# Patient Record
Sex: FEMALE | Race: BLACK OR AFRICAN AMERICAN | Employment: OTHER | URBAN - METROPOLITAN AREA
[De-identification: names, ages, dates, MRNs, and addresses within clinical notes are randomized per-mention and may not be internally consistent; named-entity substitution may affect disease eponyms.]

---

## 2017-01-03 ENCOUNTER — OFFICE VISIT (OUTPATIENT)
Dept: CARDIOLOGY CLINIC | Age: 77
End: 2017-01-03

## 2017-01-03 VITALS
OXYGEN SATURATION: 99 % | RESPIRATION RATE: 18 BRPM | HEIGHT: 60 IN | DIASTOLIC BLOOD PRESSURE: 64 MMHG | HEART RATE: 90 BPM | BODY MASS INDEX: 24.94 KG/M2 | SYSTOLIC BLOOD PRESSURE: 120 MMHG | WEIGHT: 127 LBS

## 2017-01-03 DIAGNOSIS — I25.10 CORONARY ARTERY DISEASE INVOLVING NATIVE CORONARY ARTERY, ANGINA PRESENCE UNSPECIFIED, UNSPECIFIED WHETHER NATIVE OR TRANSPLANTED HEART: Primary | ICD-10-CM

## 2017-01-03 DIAGNOSIS — E78.5 DYSLIPIDEMIA: Chronic | ICD-10-CM

## 2017-01-03 DIAGNOSIS — R06.00 DYSPNEA, UNSPECIFIED TYPE: ICD-10-CM

## 2017-01-03 DIAGNOSIS — M79.602 PAIN OF LEFT UPPER EXTREMITY: ICD-10-CM

## 2017-01-03 RX ORDER — MONTELUKAST SODIUM 10 MG/1
10 TABLET ORAL DAILY
COMMUNITY

## 2017-01-03 RX ORDER — PANTOPRAZOLE SODIUM 20 MG/1
20 TABLET, DELAYED RELEASE ORAL DAILY
COMMUNITY

## 2017-01-03 NOTE — MR AVS SNAPSHOT
Visit Information Date & Time Provider Department Dept. Phone Encounter #  
 1/3/2017 10:40 AM Dominik Alejo MD [facility] Teena Andrews [phone] [id number] Upcoming Health Maintenance Date Due DTaP/Tdap/Td series (1 - Tdap) 12/11/1963 FOBT Q 1 YEAR AGE 50-75 12/11/1992 ZOSTER VACCINE AGE 60> 12/11/2002 GLAUCOMA SCREENING Q2Y 12/11/2007 Pneumococcal 65+ High/Highest Risk (1 of 2 - PCV13) 12/11/2007 MEDICARE YEARLY EXAM 12/11/2007 INFLUENZA AGE 9 TO ADULT 8/1/2016 BREAST CANCER SCRN MAMMOGRAM 3/4/2018 Allergies as of 1/3/2017  Review Complete On: 1/3/2017 By: Padmini Camarena LPN No Known Allergies Current Immunizations  Never Reviewed No immunizations on file. Not reviewed this visit You Were Diagnosed With   
  
 Codes Comments Coronary artery disease involving native coronary artery, angina presence unspecified, unspecified whether native or transplanted heart    -  Primary ICD-10-CM: I25.10 ICD-9-CM: 414.01 Vitals BP Pulse Resp Height(growth percentile) Weight(growth percentile) SpO2  
 120/64 (BP 1 Location: Left arm, BP Patient Position: Sitting) 90 18 5' (1.524 m) 127 lb (57.6 kg) 99% BMI OB Status Smoking Status 24.8 kg/m2 Hysterectomy Former Smoker Vitals History BMI and BSA Data Body Mass Index Body Surface Area  
 24.8 kg/m 2 1.56 m 2 Preferred Pharmacy Pharmacy Name Phone [facility] [address] [facility], [address] [place] [address] AT [address] [place] [address] [phone] Your Updated Medication List  
  
   
This list is accurate as of: 1/3/17 11:29 AM.  Always use your most recent med list.  
  
  
  
  
 ALPRAZolam 1 mg tablet Commonly known as:  Alcus Rubi Take 0.5 mg by mouth three (3) times daily. aspirin delayed-release 81 mg tablet Take  by mouth daily. cholecalciferol (vitamin D3) 2,000 unit Tab Take  by mouth every other day. clopidogrel 75 mg Tab Commonly known as:  PLAVIX Take 1 Tab by mouth daily. DULERA 100-5 mcg/actuation HFA inhaler Generic drug:  mometasone-formoterol Take 2 Puffs by inhalation as needed. escitalopram oxalate 10 mg tablet Commonly known as:  Pollyann [place] Take 10 mg by mouth daily. pantoprazole 20 mg tablet Commonly known as:  PROTONIX Take 20 mg by mouth daily. polyethylene glycol 17 gram/dose powder Commonly known as:  Corazon  Take 17 g by mouth every other day. promethazine 25 mg tablet Commonly known as:  PHENERGAN Take 1 tablet by mouth every six (6) hours as needed for Nausea. QUEtiapine 100 mg tablet Commonly known as:  SEROquel Take 50 mg by mouth nightly. simvastatin 40 mg tablet Commonly known as:  ZOCOR Take 40 mg by mouth nightly. SINGULAIR 10 mg tablet Generic drug:  montelukast  
Take 10 mg by mouth daily. SPIRIVA WITH HANDIHALER 18 mcg inhalation capsule Generic drug:  tiotropium Take 1 Cap by inhalation daily. traMADol 50 mg tablet Commonly known as:  ULTRAM  
Take 50 mg by mouth as needed for Pain. traZODone 100 mg tablet Commonly known as:  Whitethorn Askew Take 50 mg by mouth nightly. Introducing [facility]! [facility] introduces [employer]Live patient portal. Now you can access parts of your medical record, email your doctor's office, and request medication refills online. 1. In your internet browser, go to [email or web address]://[email or web address]. [employer] 2. Click on the First Time User? Click Here link in the Sign In box. You will see the New Member Sign Up page. 3. Enter your [employer] Access Code exactly as it appears below. You will not need to use this code after youve completed the sign-up process. If you do not sign up before the expiration date, you must request a new code. · [employer]Live Access Code: [email or web address] Expires: 1/19/2017 11:03 AM 
 
 4. Enter the last four digits of your Social Security Number (xxxx) and Date of Birth (mm/dd/yyyy) as indicated and click Submit. You will be taken to the next sign-up page. 5. Create a [employer] ID. This will be your [employer] login ID and cannot be changed, so think of one that is secure and easy to remember. 6. Create a [employer] password. You can change your password at any time. 7. Enter your Password Reset Question and Answer. This can be used at a later time if you forget your password. 8. Enter your e-mail address. You will receive e-mail notification when new information is available in [address] 9. Click Sign Up. You can now view and download portions of your medical record. 10. Click the Download Summary menu link to download a portable copy of your medical information. If you have questions, please visit the Frequently Asked Questions section of the [employer] website. Remember, [employer] is NOT to be used for urgent needs. For medical emergencies, dial 911. Now available from your iPhone and Android! Please provide this summary of care documentation to your next provider. Your primary care clinician is listed as William Silva. If you have any questions after today's visit, please call [phone].

## 2017-03-06 ENCOUNTER — HOSPITAL ENCOUNTER (OUTPATIENT)
Dept: MAMMOGRAPHY | Age: 77
Discharge: HOME | End: 2017-03-06
Attending: FAMILY MEDICINE
Payer: MEDICARE

## 2017-03-06 DIAGNOSIS — Z12.31 VISIT FOR SCREENING MAMMOGRAM: ICD-10-CM

## 2017-03-06 PROCEDURE — 77067 SCR MAMMO BI INCL CAD: CPT

## 2017-03-09 ENCOUNTER — HOSPITAL ENCOUNTER (OUTPATIENT)
Dept: NUCLEAR MEDICINE | Age: 77
Discharge: HOME | End: 2017-03-09
Attending: INTERNAL MEDICINE
Payer: MEDICARE

## 2017-03-09 DIAGNOSIS — R10.825 PERIUMBILICAL ABDOMINAL TENDERNESS WITH REBOUND TENDERNESS: ICD-10-CM

## 2017-03-09 DIAGNOSIS — R10.811 RUQ ABDOMINAL TENDERNESS: ICD-10-CM

## 2017-03-09 PROCEDURE — 78226 HEPATOBILIARY SYSTEM IMAGING: CPT

## 2017-05-10 RX ORDER — CLOPIDOGREL BISULFATE 75 MG/1
TABLET ORAL
Qty: 90 TAB | Refills: 11 | Status: SHIPPED | OUTPATIENT
Start: 2017-05-10

## 2017-06-13 ENCOUNTER — HOSPITAL ENCOUNTER (OUTPATIENT)
Dept: CT IMAGING | Age: 77
Discharge: HOME | End: 2017-06-13
Attending: INTERNAL MEDICINE
Payer: MEDICARE

## 2017-06-13 DIAGNOSIS — R93.89 ABNORMAL CHEST CT: ICD-10-CM

## 2017-06-13 PROCEDURE — 71250 CT THORAX DX C-: CPT

## 2017-07-10 ENCOUNTER — TELEPHONE (OUTPATIENT)
Dept: CARDIOLOGY CLINIC | Age: 77
End: 2017-07-10

## 2017-08-08 ENCOUNTER — OFFICE VISIT (OUTPATIENT)
Dept: CARDIOLOGY CLINIC | Age: 77
End: 2017-08-08

## 2017-08-08 VITALS
BODY MASS INDEX: 24.5 KG/M2 | HEART RATE: 77 BPM | DIASTOLIC BLOOD PRESSURE: 70 MMHG | WEIGHT: 124.8 LBS | RESPIRATION RATE: 18 BRPM | HEIGHT: 60 IN | OXYGEN SATURATION: 98 % | SYSTOLIC BLOOD PRESSURE: 120 MMHG

## 2017-08-08 DIAGNOSIS — I73.9 PVD (PERIPHERAL VASCULAR DISEASE) (HCC): ICD-10-CM

## 2017-08-08 DIAGNOSIS — J44.9 CHRONIC OBSTRUCTIVE PULMONARY DISEASE, UNSPECIFIED COPD TYPE (HCC): ICD-10-CM

## 2017-08-08 DIAGNOSIS — I25.10 CORONARY ARTERY DISEASE INVOLVING NATIVE CORONARY ARTERY OF NATIVE HEART WITHOUT ANGINA PECTORIS: Primary | ICD-10-CM

## 2017-08-08 DIAGNOSIS — G89.29 OTHER CHRONIC PAIN: ICD-10-CM

## 2017-08-08 RX ORDER — ONDANSETRON 4 MG/1
4 TABLET, FILM COATED ORAL
COMMUNITY

## 2017-08-08 RX ORDER — HYDROCODONE BITARTRATE AND ACETAMINOPHEN 7.5; 325 MG/1; MG/1
TABLET ORAL
COMMUNITY

## 2017-08-08 NOTE — MR AVS SNAPSHOT
Visit Information Date & Time Provider Department Dept. Phone Encounter #  
 8/8/2017  8:40 AM Kumar Ureña MD [facility] Tyrese Kendall [phone] [id number] Upcoming Health Maintenance Date Due DTaP/Tdap/Td series (1 - Tdap) 12/11/1963 FOBT Q 1 YEAR AGE 50-75 12/11/1992 ZOSTER VACCINE AGE 60> 10/11/2002 GLAUCOMA SCREENING Q2Y 12/11/2007 Pneumococcal 65+ High/Highest Risk (1 of 2 - PCV13) 12/11/2007 MEDICARE YEARLY EXAM 12/11/2007 INFLUENZA AGE 9 TO ADULT 8/1/2017 Allergies as of 8/8/2017  Review Complete On: 8/8/2017 By: Daniele Madrigal No Known Allergies Current Immunizations  Never Reviewed No immunizations on file. Not reviewed this visit You Were Diagnosed With   
  
 Codes Comments Chest pain of unknown etiology    -  Primary ICD-10-CM: R07.89 ICD-9-CM: 786.59 Coronary artery disease involving native coronary artery of native heart without angina pectoris     ICD-10-CM: I25.10 ICD-9-CM: 414.01 Vitals BP Pulse Resp Height(growth percentile) Weight(growth percentile) SpO2  
 120/70 (BP 1 Location: Left arm) 77 18 5' (1.524 m) 124 lb 12.8 oz (56.6 kg) 98% BMI OB Status Smoking Status 24.37 kg/m2 Hysterectomy Former Smoker Vitals History BMI and BSA Data Body Mass Index Body Surface Area  
 24.37 kg/m 2 1.55 m 2 Preferred Pharmacy Pharmacy Name Phone [address] [facility] AT [address] [place] [address]. [phone] Your Updated Medication List  
  
   
This list is accurate as of: 8/8/17  8:54 AM.  Always use your most recent med list.  
  
  
  
  
 ALPRAZolam 1 mg tablet Commonly known as:  Johnice Salts Take 0.5 mg by mouth three (3) times daily. aspirin delayed-release 81 mg tablet Take  by mouth daily. cholecalciferol (vitamin D3) 2,000 unit Tab Take  by mouth every other day. clopidogrel 75 mg Tab Commonly known as:  PLAVIX TAKE 1 TABLET BY MOUTH DAILY  
  
 escitalopram oxalate 10 mg tablet Commonly known as:  [place] Take 10 mg by mouth daily. HYDROcodone-acetaminophen 7.5-325 mg per tablet Commonly known as:  Bia Laity Take  by mouth two (2) times daily as needed for Pain.  
  
 pantoprazole 20 mg tablet Commonly known as:  PROTONIX Take 20 mg by mouth daily. polyethylene glycol 17 gram/dose powder Commonly known as:  Lugene Minder Take 17 g by mouth every other day. promethazine 25 mg tablet Commonly known as:  PHENERGAN Take 1 tablet by mouth every six (6) hours as needed for Nausea. QUEtiapine 100 mg tablet Commonly known as:  SEROquel Take 50 mg by mouth nightly. simvastatin 40 mg tablet Commonly known as:  ZOCOR Take 40 mg by mouth nightly. SINGULAIR 10 mg tablet Generic drug:  montelukast  
Take 10 mg by mouth daily. SPIRIVA WITH HANDIHALER 18 mcg inhalation capsule Generic drug:  tiotropium Take 1 Cap by inhalation daily. traZODone 100 mg tablet Commonly known as:  Erum Edgerton Take 50 mg by mouth nightly. ZOFRAN (AS HYDROCHLORIDE) 4 mg tablet Generic drug:  ondansetron hcl Take 4 mg by mouth every eight (8) hours as needed for Nausea. We Performed the Following AMB POC EKG ROUTINE W/ 12 LEADS, INTER & REP [86046 CPT(R)] Introducing [facility]! [employer] introduces [employer]ap patient portal. Now you can access parts of your medical record, email your doctor's office, and request medication refills online. 1. In your internet browser, go to [email or web address]://[email or web address]. [employer] 2. Click on the First Time User? Click Here link in the Sign In box. You will see the New Member Sign Up page. 3. Enter your [employer] Access Code exactly as it appears below. You will not need to use this code after youve completed the sign-up process.  If you do not sign up before the expiration date, you must request a new code. · [employer] Access Code: [email or web address] Expires: 9/4/2017  3:48 PM 
 
4. Enter the last four digits of your Social Security Number (xxxx) and Date of Birth (mm/dd/yyyy) as indicated and click Submit. You will be taken to the next sign-up page. 5. Create a [employer] ID. This will be your [employer] login ID and cannot be changed, so think of one that is secure and easy to remember. 6. Create a FilterSure password. You can change your password at any time. 7. Enter your Password Reset Question and Answer. This can be used at a later time if you forget your password. 8. Enter your e-mail address. You will receive e-mail notification when new information is available in [address] 9. Click Sign Up. You can now view and download portions of your medical record. 10. Click the Download Summary menu link to download a portable copy of your medical information. If you have questions, please visit the Frequently Asked Questions section of the [employer] website. Remember, FilterSure is NOT to be used for urgent needs. For medical emergencies, dial 911. Now available from your iPhone and Android! Please provide this summary of care documentation to your next provider. Your primary care clinician is listed as Eduardo Sinclair. If you have any questions after today's visit, please call [phone].

## 2017-08-08 NOTE — PROGRESS NOTES
Mike Martinez MD    [address] [facility] [place], [address] [facility]    Office [phone],[place] [phone]  Pager [phone] is a 78 y.o. 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 Probably musculoskeletal etiology. EKG no new changes. Hx of PVD  S/p L to R fem fem bypass 6/14/13 ( Dr Eulalia Elkins) complicated by  graft infection -seen vascular surgeon and was told that her circulation is doing okay  Hx of  CAD; UA (10/10/12) s/p PCI to mid LAD with SHARON ( xience 2.25by18); RCA -40%; Lcflex MLI; EF 45%; ECHO 10/11/12 -EF 60% ; ECHO ( prior) - Mild AR/ PFO  HLD  COPD   Hx of Ca lung s/p resection ( in remission/no radiation/chemo)   Anxiety /Depression -continues to be anxious during office visit  Hx of chronic Low Back Pain - MRI - DJD with disc disease- followed by Dr Francy Greene - has been advised PT/\"injections to back\"  Dyspnea    Plan:     Missy nuc 5/24/16-- nml/Nml EF. I had a long discussion with the patient and her son. I do not think the patient needs a cardiac catheterization at this point but I did indicate to them that if she continues to have chest tightness we may have to do the cardiac catheterization to rule out significant obstructive CAD.   However, I encouraged her to discuss with her physicians regarding her chronic pain/depression and anxiety. Patient and her son agree with her weight and watch policy with regards to her chronic atypical chest pain in view of the normal stress nuclear study in 2016. Continue cardiac meds  Pt currently taking statin - lipids per PCP  Patient is not on beta-blockers. She was briefly on midodrine for orthostatic hypotension and has not been able to tolerate antihypertensive medication. Continue to f/u specialists - ortho/psychiatrist advised to follow-up with primary care physician. F/u 6 mths     I appreciate the opportunity to be involved in Ms. Hatchettcare. See note below for details. Please do not hesitate to contact us with questions or concerns. Cong Bailey MD    Cardiac Testing/ Procedures: A. Cardiac Cath/PCI:10/10/12Prox/mid LAD - tubular lesion  Successful PCI with SHARON (Xience 2.5by 18mm)  -- Global left ventricular function was mildly depressed. EF calculated  by contrast ventriculography was 45 %. Probable LVH  -- Mid LAD: There was a 85 % stenosis. -- Proximal RCA: There was a 40 % stenosis. B.ECHO/RICHARD: 1/27/16 Procedure information: Technically limited study due to off axis window. Apical window was located 3rd intercostal extreme lateral towards back. Left ventricle: Systolic function was normal. Ejection fraction was  estimated in the range of 50 % to 55 %. There were no regional wall motion  abnormalities. Doppler parameters were consistent with abnormal left  ventricular relaxation (grade 1 diastolic dysfunction). Aortic valve: There was mild regurgitation. Pulmonic valve: There was mild to moderate regurgitation  10/11/12   Left ventricle: Size was normal. Systolic function was normal. Ejection  fraction was estimated to be 60 %. There were no regional wall motion  abnormalities. Doppler parameters were consistent with abnormal left  ventricular relaxation (grade 1 diastolic dysfunction). Right ventricle:  The size was normal. Systolic function was normal.    Atrial septum: There was a small left-to-right shunt on color Doppler and  right to left shunt on agitated saline study, suggestive of a patent  foramen ovale. Aortic valve: Regurgitation grade was 1+ on a scale of 0 to 4+. C.StressNuclear/Stress ECHO/Stress test:  May 24, 16 - LVEF 73%/Nml Missy nuc  D. 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 3 vessel run off bilateral  E. EP:   F. Miscellaneous: 6/14/13 Left to right femorofemoral bypass graft. ( Dr Yasir Amin)      Subjective:  Archie Reeves is a 78 y.o. female who returns for follow up visit. Patient is accompanied by her son today. She complains of multiple problems including chronic neck pain, left upper extremity pain, low back pain, chest pain, lower extremity pain. She apparently has been given narcotic pain medication for her back pain. She states that her medications are not helping her. She is also very tearful during the office visit. Son reports that patient has been anxious/depressed/has chronic pain. He feels that she has been shunted between various doctors and \"nobody seems to be helping her\". Patient states that her chest tightness has not changed significantly from her prior visits. Also complains of bloating sensation in her abdomen. Mild dyspnea on exertion. She has a history of PVD and has seen  vascular surgeon and he has told her that her circulation in the legs is doing well. Pt not active sec to pain issues. ROS:  (bold if positive, if negative)     Pain LE/Joints            Current Outpatient Prescriptions   Medication Sig Dispense    HYDROcodone-acetaminophen (NORCO) 7.5-325 mg per tablet Take  by mouth two (2) times daily as needed for Pain.  ondansetron hcl (ZOFRAN, AS HYDROCHLORIDE,) 4 mg tablet Take 4 mg by mouth every eight (8) hours as needed for Nausea.  clopidogrel (PLAVIX) 75 mg tab TAKE 1 TABLET BY MOUTH DAILY 90 Tab    montelukast (SINGULAIR) 10 mg tablet Take 10 mg by mouth daily.  pantoprazole (PROTONIX) 20 mg tablet Take 20 mg by mouth daily.  cholecalciferol, vitamin D3, 2,000 unit tab Take  by mouth every other day.  polyethylene glycol (MIRALAX) 17 gram/dose powder Take 17 g by mouth every other day.  aspirin delayed-release 81 mg tablet Take  by mouth daily.  QUEtiapine (SEROQUEL) 100 mg tablet Take 50 mg by mouth nightly.  escitalopram oxalate (LEXAPRO) 10 mg tablet Take 10 mg by mouth daily.  simvastatin (ZOCOR) 40 mg tablet Take 40 mg by mouth nightly.  promethazine (PHENERGAN) 25 mg tablet Take 1 tablet by mouth every six (6) hours as needed for Nausea. 15 tablet    ALPRAZolam (XANAX) 1 mg tablet Take 0.5 mg by mouth three (3) times daily.  traZODone (DESYREL) 100 mg tablet Take 50 mg by mouth nightly.  tiotropium (SPIRIVA WITH HANDIHALER) 18 mcg inhalation capsule Take 1 Cap by inhalation daily. No current facility-administered medications for this visit. Physical Exam:  Visit Vitals    /70 (BP 1 Location: Left arm)    Pulse 77    Resp 18    Ht 5' (1.524 m)    Wt 124 lb 12.8 oz (56.6 kg)    SpO2 98%    BMI 24.37 kg/m2          Gen: Well-developed, elderly, tearful  Neck: Supple,No JVD, No Carotid Bruit,   Resp: No accessory muscle use, Clear breath sounds, No rales or rhonchi  Card: Regular Rate,Rythm,Normal S1, S2, 2/6 sys murmur+, No rubs or gallop. No thrills. Abd:  Soft, non-tender, non-distended,BS+,   MSK: No cyanosis. Skin: No rashes    Neuro: moving all four extremities , follows commands appropriately  Psych:   oriented to person, place , alert, Tearful  LE: No edema    EKG: Sinus rhythm, T-wave changes anterior leads.   (No changes from prior EKG 3/2016)      LABS:        Lab Results   Component Value Date/Time    WBC 7.3 03/11/2016 05:47 PM    HGB 12.0 03/11/2016 05:47 PM    HCT 36.8 03/11/2016 05:47 PM    PLATELET 171 79/75/3833 05:47 PM    MCV 90.9 03/11/2016 05:47 PM     Lab Results   Component Value Date/Time    Sodium 140 03/11/2016 05:47 PM    Potassium 3.8 03/11/2016 05:47 PM    Chloride 106 03/11/2016 05:47 PM    CO2 28 03/11/2016 05:47 PM    Anion gap 6 03/11/2016 05:47 PM    Glucose 84 03/11/2016 05:47 PM    BUN 11 03/11/2016 05:47 PM    Creatinine 1.05 03/11/2016 05:47 PM    BUN/Creatinine ratio 10 03/11/2016 05:47 PM    GFR est AA >60 03/11/2016 05:47 PM    GFR est non-AA 51 03/11/2016 05:47 PM    Calcium 8.9 03/11/2016 05:47 PM       Lab Results   Component Value Date/Time    aPTT 24.7 09/17/2013 01:43 PM     Lab Results   Component Value Date/Time    INR 1.0 03/11/2016 05:47 PM    INR 1.1 09/17/2013 01:43 PM    INR 1.1 06/10/2013 03:58 PM    Prothrombin time 10.4 03/11/2016 05:47 PM    Prothrombin time 11.0 09/17/2013 01:43 PM    Prothrombin time 11.0 06/10/2013 03:58 PM     No components found for: Alis Ivory MD

## 2017-10-02 RX ORDER — CLOPIDOGREL BISULFATE 75 MG/1
TABLET ORAL
Qty: 30 TAB | Refills: 0 | Status: SHIPPED | OUTPATIENT
Start: 2017-10-02 | End: 2017-11-03 | Stop reason: SDUPTHER

## 2017-11-06 NOTE — TELEPHONE ENCOUNTER
Refill is per verbal order of Dr. Silas Jo.     Requested Prescriptions     Pending Prescriptions Disp Refills    clopidogrel (PLAVIX) 75 mg tab [Pharmacy Med Name: CLOPIDOGREL 75MG TABLETS] 30 Tab 3     Sig: TAKE 1 TABLET BY MOUTH DAILY

## 2017-11-07 RX ORDER — CLOPIDOGREL BISULFATE 75 MG/1
TABLET ORAL
Qty: 30 TAB | Refills: 3 | Status: SHIPPED | OUTPATIENT
Start: 2017-11-07 | End: 2022-01-01

## 2018-02-16 ENCOUNTER — HOSPITAL ENCOUNTER (OUTPATIENT)
Dept: CT IMAGING | Age: 78
Discharge: HOME | End: 2018-02-16
Attending: NURSE PRACTITIONER
Payer: MEDICARE

## 2018-02-16 DIAGNOSIS — R93.89 ABNORMAL CHEST CT: ICD-10-CM

## 2018-02-16 PROCEDURE — 71250 CT THORAX DX C-: CPT

## 2018-03-07 ENCOUNTER — HOSPITAL ENCOUNTER (OUTPATIENT)
Dept: PET IMAGING | Age: 78
Discharge: HOME | End: 2018-03-07
Attending: NURSE PRACTITIONER
Payer: MEDICARE

## 2018-03-07 VITALS — WEIGHT: 125 LBS | BODY MASS INDEX: 25.2 KG/M2 | HEIGHT: 59 IN

## 2018-03-07 DIAGNOSIS — R91.1 LUNG NODULE: ICD-10-CM

## 2018-03-07 PROCEDURE — A9552 F18 FDG: HCPCS

## 2018-03-07 RX ORDER — SODIUM CHLORIDE 0.9 % (FLUSH) 0.9 %
10 SYRINGE (ML) INJECTION
Status: COMPLETED | OUTPATIENT
Start: 2018-03-07 | End: 2018-03-07

## 2018-03-07 RX ADMIN — Medication 10 ML: at 12:55

## 2018-06-05 ENCOUNTER — HOSPITAL ENCOUNTER (OUTPATIENT)
Dept: MAMMOGRAPHY | Age: 78
Discharge: HOME | End: 2018-06-05
Attending: FAMILY MEDICINE
Payer: MEDICARE

## 2018-06-05 ENCOUNTER — HOSPITAL ENCOUNTER (OUTPATIENT)
Dept: CT IMAGING | Age: 78
Discharge: HOME | End: 2018-06-05
Attending: NURSE PRACTITIONER
Payer: MEDICARE

## 2018-06-05 DIAGNOSIS — R93.89 ABNORMAL CHEST CT: ICD-10-CM

## 2018-06-05 DIAGNOSIS — Z12.39 BREAST SCREENING: ICD-10-CM

## 2018-06-05 PROCEDURE — 77067 SCR MAMMO BI INCL CAD: CPT

## 2018-06-05 PROCEDURE — 71250 CT THORAX DX C-: CPT

## 2018-06-24 ENCOUNTER — HOSPITAL ENCOUNTER (OUTPATIENT)
Dept: MRI IMAGING | Age: 78
Discharge: HOME | End: 2018-06-24
Attending: PAIN MEDICINE
Payer: MEDICARE

## 2018-06-24 DIAGNOSIS — M54.16 RADICULOPATHY, LUMBAR REGION: ICD-10-CM

## 2018-06-24 PROCEDURE — 72148 MRI LUMBAR SPINE W/O DYE: CPT

## 2018-07-16 ENCOUNTER — DOCUMENTATION ONLY (OUTPATIENT)
Dept: CARDIOLOGY CLINIC | Age: 78
End: 2018-07-16

## 2018-07-16 NOTE — PROGRESS NOTES
Spoke with pt re her spine injections. Pt stated she has not decided if she is going to have them done or not. Advised that Spine center is asking for medication eval to hold Plavix prior to and that they would need something faxed to them. Pt advised she will call back with info if she does decide to have it done.

## 2018-08-09 ENCOUNTER — TELEPHONE (OUTPATIENT)
Dept: CARDIOLOGY CLINIC | Age: 78
End: 2018-08-09

## 2018-08-09 NOTE — TELEPHONE ENCOUNTER
Pt is calling to get clearance to hold her blood thinners for an injection she needs to have in her back. She can be reached @ [phone].      Thanks

## 2018-08-10 NOTE — TELEPHONE ENCOUNTER
Dr. Aileen Adams is out of office. Pt last injection was: 7/3/18 and he held Plavix for 5 days prior to procedure and restarted post procedure when safe to do so. Will forward to another Physician for recommendation. Please advise on holding Plavix for spinal injection.

## 2018-08-10 NOTE — TELEPHONE ENCOUNTER
Return call placed to pt. [facility] for return call. Per VO of Dr. Raina Bear pt may hold Plavix 5-7 days prior.

## 2018-09-13 ENCOUNTER — HOSPITAL ENCOUNTER (OUTPATIENT)
Dept: CT IMAGING | Age: 78
Discharge: HOME | End: 2018-09-13
Attending: NURSE PRACTITIONER
Payer: MEDICARE

## 2018-09-13 DIAGNOSIS — R93.89 ABNORMAL CHEST CT: ICD-10-CM

## 2018-09-13 PROCEDURE — 71250 CT THORAX DX C-: CPT

## 2019-02-18 ENCOUNTER — HOSPITAL ENCOUNTER (OUTPATIENT)
Dept: CT IMAGING | Age: 79
Discharge: HOME | End: 2019-02-18
Attending: NURSE PRACTITIONER
Payer: MEDICARE

## 2019-02-18 DIAGNOSIS — R93.89 ABNORMAL CHEST CT: ICD-10-CM

## 2019-02-18 PROCEDURE — 71250 CT THORAX DX C-: CPT

## 2020-03-06 ENCOUNTER — HOSPITAL ENCOUNTER (OUTPATIENT)
Dept: MAMMOGRAPHY | Age: 80
Discharge: HOME | End: 2020-03-06
Attending: FAMILY MEDICINE
Payer: MEDICARE

## 2020-03-06 DIAGNOSIS — Z12.31 VISIT FOR SCREENING MAMMOGRAM: ICD-10-CM

## 2020-03-06 PROCEDURE — 77067 SCR MAMMO BI INCL CAD: CPT

## 2021-04-27 ENCOUNTER — TRANSCRIBE ORDER (OUTPATIENT)
Dept: SCHEDULING | Age: 81
End: 2021-04-27

## 2021-04-27 DIAGNOSIS — Z12.31 VISIT FOR SCREENING MAMMOGRAM: Primary | ICD-10-CM

## 2021-06-04 ENCOUNTER — HOSPITAL ENCOUNTER (OUTPATIENT)
Dept: MAMMOGRAPHY | Age: 81
Discharge: HOME | End: 2021-06-04
Attending: FAMILY MEDICINE
Payer: MEDICAID

## 2021-06-04 DIAGNOSIS — Z12.31 VISIT FOR SCREENING MAMMOGRAM: ICD-10-CM

## 2021-06-04 PROCEDURE — 77067 SCR MAMMO BI INCL CAD: CPT

## 2021-07-21 ENCOUNTER — HOSPITAL ENCOUNTER (OUTPATIENT)
Age: 81
Setting detail: OBSERVATION
Discharge: HOME | End: 2021-07-22
Attending: EMERGENCY MEDICINE | Admitting: STUDENT IN AN ORGANIZED HEALTH CARE EDUCATION/TRAINING PROGRAM
Payer: MEDICARE

## 2021-07-21 ENCOUNTER — APPOINTMENT (OUTPATIENT)
Dept: GENERAL RADIOLOGY | Age: 81
End: 2021-07-21
Attending: EMERGENCY MEDICINE
Payer: MEDICARE

## 2021-07-21 ENCOUNTER — APPOINTMENT (OUTPATIENT)
Dept: CT IMAGING | Age: 81
End: 2021-07-21
Attending: EMERGENCY MEDICINE
Payer: MEDICARE

## 2021-07-21 DIAGNOSIS — R07.9 CHEST PAIN, UNSPECIFIED TYPE: ICD-10-CM

## 2021-07-21 DIAGNOSIS — E87.6 HYPOKALEMIA: ICD-10-CM

## 2021-07-21 DIAGNOSIS — J98.4 CAVITARY LESION OF LUNG: ICD-10-CM

## 2021-07-21 DIAGNOSIS — I25.10 CORONARY ARTERY DISEASE INVOLVING NATIVE CORONARY ARTERY OF NATIVE HEART WITHOUT ANGINA PECTORIS: ICD-10-CM

## 2021-07-21 DIAGNOSIS — R94.31 ECG ABNORMAL: Primary | ICD-10-CM

## 2021-07-21 PROBLEM — I20.8 ATYPICAL ANGINA (HCC): Status: ACTIVE | Noted: 2021-07-21

## 2021-07-21 LAB
ALBUMIN SERPL-MCNC: 3.2 G/DL (ref 3.5–5)
ALBUMIN/GLOB SERPL: 0.8 {RATIO} (ref 1.1–2.2)
ALP SERPL-CCNC: 75 U/L (ref 45–117)
ALT SERPL-CCNC: 18 U/L (ref 12–78)
ANION GAP SERPL CALC-SCNC: 8 MMOL/L (ref 5–15)
APPEARANCE UR: ABNORMAL
AST SERPL-CCNC: 20 U/L (ref 15–37)
ATRIAL RATE: 66 BPM
BACTERIA URNS QL MICRO: NEGATIVE /HPF
BASOPHILS # BLD: 0 K/UL (ref 0–0.1)
BASOPHILS NFR BLD: 1 % (ref 0–1)
BILIRUB SERPL-MCNC: 0.5 MG/DL (ref 0.2–1)
BILIRUB UR QL: NEGATIVE
BNP SERPL-MCNC: 309 PG/ML
BUN SERPL-MCNC: 11 MG/DL (ref 6–20)
BUN/CREAT SERPL: 15 (ref 12–20)
CALCIUM SERPL-MCNC: 8.8 MG/DL (ref 8.5–10.1)
CALCULATED P AXIS, ECG09: 14 DEGREES
CALCULATED R AXIS, ECG10: -14 DEGREES
CALCULATED T AXIS, ECG11: -61 DEGREES
CHLORIDE SERPL-SCNC: 110 MMOL/L (ref 97–108)
CO2 SERPL-SCNC: 22 MMOL/L (ref 21–32)
COLOR UR: ABNORMAL
COMMENT, HOLDF: NORMAL
CREAT SERPL-MCNC: 0.73 MG/DL (ref 0.55–1.02)
DIAGNOSIS, 93000: NORMAL
DIFFERENTIAL METHOD BLD: ABNORMAL
EOSINOPHIL # BLD: 0.1 K/UL (ref 0–0.4)
EOSINOPHIL NFR BLD: 1 % (ref 0–7)
EPITH CASTS URNS QL MICRO: ABNORMAL /LPF
ERYTHROCYTE [DISTWIDTH] IN BLOOD BY AUTOMATED COUNT: 12.7 % (ref 11.5–14.5)
GLOBULIN SER CALC-MCNC: 4.1 G/DL (ref 2–4)
GLUCOSE SERPL-MCNC: 54 MG/DL (ref 65–100)
GLUCOSE UR STRIP.AUTO-MCNC: NEGATIVE MG/DL
HCT VFR BLD AUTO: 33.5 % (ref 35–47)
HGB BLD-MCNC: 11.4 G/DL (ref 11.5–16)
HGB UR QL STRIP: NEGATIVE
HYALINE CASTS URNS QL MICRO: ABNORMAL /LPF (ref 0–5)
IMM GRANULOCYTES # BLD AUTO: 0 K/UL (ref 0–0.04)
IMM GRANULOCYTES NFR BLD AUTO: 0 % (ref 0–0.5)
KETONES UR QL STRIP.AUTO: NEGATIVE MG/DL
LEUKOCYTE ESTERASE UR QL STRIP.AUTO: NEGATIVE
LIPASE SERPL-CCNC: 108 U/L (ref 73–393)
LYMPHOCYTES # BLD: 2.7 K/UL (ref 0.8–3.5)
LYMPHOCYTES NFR BLD: 37 % (ref 12–49)
MAGNESIUM SERPL-MCNC: 1.8 MG/DL (ref 1.6–2.4)
MCH RBC QN AUTO: 30.8 PG (ref 26–34)
MCHC RBC AUTO-ENTMCNC: 34 G/DL (ref 30–36.5)
MCV RBC AUTO: 90.5 FL (ref 80–99)
MONOCYTES # BLD: 0.8 K/UL (ref 0–1)
MONOCYTES NFR BLD: 11 % (ref 5–13)
NEUTS SEG # BLD: 3.6 K/UL (ref 1.8–8)
NEUTS SEG NFR BLD: 50 % (ref 32–75)
NITRITE UR QL STRIP.AUTO: NEGATIVE
NRBC # BLD: 0 K/UL (ref 0–0.01)
NRBC BLD-RTO: 0 PER 100 WBC
P-R INTERVAL, ECG05: 112 MS
PH UR STRIP: 8.5 [PH] (ref 5–8)
PLATELET # BLD AUTO: 212 K/UL (ref 150–400)
PMV BLD AUTO: 9.6 FL (ref 8.9–12.9)
POTASSIUM SERPL-SCNC: 3.3 MMOL/L (ref 3.5–5.1)
PROT SERPL-MCNC: 7.3 G/DL (ref 6.4–8.2)
PROT UR STRIP-MCNC: NEGATIVE MG/DL
Q-T INTERVAL, ECG07: 458 MS
QRS DURATION, ECG06: 84 MS
QTC CALCULATION (BEZET), ECG08: 480 MS
RBC # BLD AUTO: 3.7 M/UL (ref 3.8–5.2)
RBC #/AREA URNS HPF: ABNORMAL /HPF (ref 0–5)
SAMPLES BEING HELD,HOLD: NORMAL
SODIUM SERPL-SCNC: 140 MMOL/L (ref 136–145)
SP GR UR REFRACTOMETRY: 1.02 (ref 1–1.03)
TROPONIN I SERPL-MCNC: <0.05 NG/ML
TROPONIN I SERPL-MCNC: <0.05 NG/ML
UR CULT HOLD, URHOLD: NORMAL
UROBILINOGEN UR QL STRIP.AUTO: 1 EU/DL (ref 0.2–1)
VENTRICULAR RATE, ECG03: 66 BPM
WBC # BLD AUTO: 7.2 K/UL (ref 3.6–11)
WBC URNS QL MICRO: ABNORMAL /HPF (ref 0–4)

## 2021-07-21 PROCEDURE — 36415 COLL VENOUS BLD VENIPUNCTURE: CPT

## 2021-07-21 PROCEDURE — 96372 THER/PROPH/DIAG INJ SC/IM: CPT

## 2021-07-21 PROCEDURE — 83880 ASSAY OF NATRIURETIC PEPTIDE: CPT

## 2021-07-21 PROCEDURE — 80053 COMPREHEN METABOLIC PANEL: CPT

## 2021-07-21 PROCEDURE — 74177 CT ABD & PELVIS W/CONTRAST: CPT

## 2021-07-21 PROCEDURE — 99218 HC RM OBSERVATION: CPT

## 2021-07-21 PROCEDURE — 74011250637 HC RX REV CODE- 250/637: Performed by: EMERGENCY MEDICINE

## 2021-07-21 PROCEDURE — 83735 ASSAY OF MAGNESIUM: CPT

## 2021-07-21 PROCEDURE — 74011250636 HC RX REV CODE- 250/636: Performed by: STUDENT IN AN ORGANIZED HEALTH CARE EDUCATION/TRAINING PROGRAM

## 2021-07-21 PROCEDURE — 71275 CT ANGIOGRAPHY CHEST: CPT

## 2021-07-21 PROCEDURE — 71045 X-RAY EXAM CHEST 1 VIEW: CPT

## 2021-07-21 PROCEDURE — 83690 ASSAY OF LIPASE: CPT

## 2021-07-21 PROCEDURE — 74011000636 HC RX REV CODE- 636: Performed by: EMERGENCY MEDICINE

## 2021-07-21 PROCEDURE — 84484 ASSAY OF TROPONIN QUANT: CPT

## 2021-07-21 PROCEDURE — 93005 ELECTROCARDIOGRAM TRACING: CPT

## 2021-07-21 PROCEDURE — 81001 URINALYSIS AUTO W/SCOPE: CPT

## 2021-07-21 PROCEDURE — 99285 EMERGENCY DEPT VISIT HI MDM: CPT

## 2021-07-21 PROCEDURE — 74011250637 HC RX REV CODE- 250/637: Performed by: STUDENT IN AN ORGANIZED HEALTH CARE EDUCATION/TRAINING PROGRAM

## 2021-07-21 PROCEDURE — 85025 COMPLETE CBC W/AUTO DIFF WBC: CPT

## 2021-07-21 RX ORDER — ALPRAZOLAM 0.25 MG/1
1 TABLET ORAL EVERY EVENING
Status: DISCONTINUED | OUTPATIENT
Start: 2021-07-22 | End: 2021-07-22 | Stop reason: HOSPADM

## 2021-07-21 RX ORDER — SODIUM CHLORIDE 0.9 % (FLUSH) 0.9 %
5-40 SYRINGE (ML) INJECTION EVERY 8 HOURS
Status: DISCONTINUED | OUTPATIENT
Start: 2021-07-21 | End: 2021-07-22 | Stop reason: HOSPADM

## 2021-07-21 RX ORDER — ONDANSETRON 4 MG/1
4 TABLET, ORALLY DISINTEGRATING ORAL
Status: DISCONTINUED | OUTPATIENT
Start: 2021-07-21 | End: 2021-07-22 | Stop reason: HOSPADM

## 2021-07-21 RX ORDER — SODIUM CHLORIDE 0.9 % (FLUSH) 0.9 %
5-40 SYRINGE (ML) INJECTION AS NEEDED
Status: DISCONTINUED | OUTPATIENT
Start: 2021-07-21 | End: 2021-07-22 | Stop reason: HOSPADM

## 2021-07-21 RX ORDER — ACETAMINOPHEN 325 MG/1
650 TABLET ORAL
Status: DISCONTINUED | OUTPATIENT
Start: 2021-07-21 | End: 2021-07-22 | Stop reason: HOSPADM

## 2021-07-21 RX ORDER — HEPARIN SODIUM 5000 [USP'U]/ML
5000 INJECTION, SOLUTION INTRAVENOUS; SUBCUTANEOUS EVERY 8 HOURS
Status: DISCONTINUED | OUTPATIENT
Start: 2021-07-21 | End: 2021-07-22 | Stop reason: HOSPADM

## 2021-07-21 RX ORDER — NAPROXEN 250 MG/1
500 TABLET ORAL
Status: COMPLETED | OUTPATIENT
Start: 2021-07-21 | End: 2021-07-21

## 2021-07-21 RX ORDER — POLYETHYLENE GLYCOL 3350 17 G/17G
17 POWDER, FOR SOLUTION ORAL DAILY PRN
Status: DISCONTINUED | OUTPATIENT
Start: 2021-07-21 | End: 2021-07-22 | Stop reason: HOSPADM

## 2021-07-21 RX ORDER — MONTELUKAST SODIUM 10 MG/1
10 TABLET ORAL DAILY
Status: DISCONTINUED | OUTPATIENT
Start: 2021-07-22 | End: 2021-07-22 | Stop reason: HOSPADM

## 2021-07-21 RX ORDER — NITROGLYCERIN 0.4 MG/1
0.4 TABLET SUBLINGUAL
Status: DISCONTINUED | OUTPATIENT
Start: 2021-07-21 | End: 2021-07-22 | Stop reason: HOSPADM

## 2021-07-21 RX ORDER — ACETAMINOPHEN 650 MG/1
650 SUPPOSITORY RECTAL
Status: DISCONTINUED | OUTPATIENT
Start: 2021-07-21 | End: 2021-07-22 | Stop reason: HOSPADM

## 2021-07-21 RX ORDER — TRAZODONE HYDROCHLORIDE 100 MG/1
50 TABLET ORAL
Status: DISCONTINUED | OUTPATIENT
Start: 2021-07-21 | End: 2021-07-22 | Stop reason: HOSPADM

## 2021-07-21 RX ORDER — IPRATROPIUM BROMIDE 0.5 MG/2.5ML
0.5 SOLUTION RESPIRATORY (INHALATION) DAILY
Status: DISCONTINUED | OUTPATIENT
Start: 2021-07-22 | End: 2021-07-22 | Stop reason: HOSPADM

## 2021-07-21 RX ORDER — CILOSTAZOL 50 MG/1
50 TABLET ORAL DAILY
Status: DISCONTINUED | OUTPATIENT
Start: 2021-07-22 | End: 2021-07-22 | Stop reason: HOSPADM

## 2021-07-21 RX ORDER — OMEPRAZOLE 20 MG/1
20 CAPSULE, DELAYED RELEASE ORAL DAILY
COMMUNITY

## 2021-07-21 RX ORDER — ATORVASTATIN CALCIUM 10 MG/1
20 TABLET, FILM COATED ORAL
Status: DISCONTINUED | OUTPATIENT
Start: 2021-07-21 | End: 2021-07-22 | Stop reason: HOSPADM

## 2021-07-21 RX ORDER — NAPROXEN 375 MG/1
375 TABLET ORAL AS NEEDED
COMMUNITY
Start: 2021-07-13

## 2021-07-21 RX ORDER — ONDANSETRON 2 MG/ML
4 INJECTION INTRAMUSCULAR; INTRAVENOUS
Status: DISCONTINUED | OUTPATIENT
Start: 2021-07-21 | End: 2021-07-22 | Stop reason: HOSPADM

## 2021-07-21 RX ORDER — POTASSIUM CHLORIDE 750 MG/1
40 TABLET, FILM COATED, EXTENDED RELEASE ORAL
Status: COMPLETED | OUTPATIENT
Start: 2021-07-21 | End: 2021-07-21

## 2021-07-21 RX ORDER — ASPIRIN 81 MG/1
81 TABLET ORAL DAILY
Status: DISCONTINUED | OUTPATIENT
Start: 2021-07-22 | End: 2021-07-22 | Stop reason: HOSPADM

## 2021-07-21 RX ORDER — NAPROXEN 250 MG/1
375 TABLET ORAL
Status: DISCONTINUED | OUTPATIENT
Start: 2021-07-21 | End: 2021-07-22 | Stop reason: HOSPADM

## 2021-07-21 RX ORDER — HYDROCODONE BITARTRATE AND ACETAMINOPHEN 7.5; 325 MG/1; MG/1
1 TABLET ORAL
Status: DISCONTINUED | OUTPATIENT
Start: 2021-07-21 | End: 2021-07-22 | Stop reason: HOSPADM

## 2021-07-21 RX ORDER — CILOSTAZOL 50 MG/1
TABLET ORAL
COMMUNITY
Start: 2021-07-04

## 2021-07-21 RX ADMIN — NAPROXEN 500 MG: 250 TABLET ORAL at 19:53

## 2021-07-21 RX ADMIN — TRAZODONE HYDROCHLORIDE 50 MG: 100 TABLET ORAL at 23:34

## 2021-07-21 RX ADMIN — HYDROCODONE BITARTRATE AND ACETAMINOPHEN 1 TABLET: 7.5; 325 TABLET ORAL at 23:53

## 2021-07-21 RX ADMIN — HEPARIN SODIUM 5000 UNITS: 5000 INJECTION INTRAVENOUS; SUBCUTANEOUS at 23:35

## 2021-07-21 RX ADMIN — POTASSIUM CHLORIDE 40 MEQ: 750 TABLET, FILM COATED, EXTENDED RELEASE ORAL at 19:54

## 2021-07-21 RX ADMIN — ATORVASTATIN CALCIUM 20 MG: 10 TABLET, FILM COATED ORAL at 23:34

## 2021-07-21 RX ADMIN — IOPAMIDOL 100 ML: 755 INJECTION, SOLUTION INTRAVENOUS at 17:47

## 2021-07-21 NOTE — ED PROVIDER NOTES
Patient is a 57-year-old female with past medical history significant for lung cancer status post lung resection, COPD, coronary artery disease, dyslipidemia, depression, GERD who presents emergency department with left-sided chest pain. She states that the pain actually started in her left posterior shoulder and seems to radiate from her left posterior neck down to her chest, shoulder and abdomen. Family at bedside states that she is actually had chronic pain on this left side ever since having her lung surgery some years ago. Patient denies any recent fever or productive cough but does state that she had a cough a few weeks ago for which she was seen by an urgent care and prescribed antibiotics with improvement of her symptoms. Patient also notes intermittent chronic abdominal pain which she relates to having stents placed in the blood vessels to her legs. Patient does state that on July 3 she fell onto her right side but was not evaluated. She states that now she is having pleuritic pain on her left side of her chest.  History is somewhat limited due to patient being a vague historian.            Past Medical History:   Diagnosis Date    Anxiety     CAD (coronary artery disease) 10/11/2012    Cancer ([address]     lung    Cancer ([address]     lung CA    Chronic obstructive pulmonary disease (HCC)     Chronic pain     legs and lower back    COPD     Depression     Dyslipidemia 10/11/2012    Endocrine disease     Gastrointestinal disorder     gastric ulcers    GERD (gastroesophageal reflux disease)     Headache(784.0)     Hematuria     Hypertension     Lung cancer ([address]     Malignant neoplasm of bronchus ([address]     Meningitis 2010    Psychiatric disorder     depression    Psychiatric disorder     depression, anxiety    PUD (peptic ulcer disease)        Past Surgical History:   Procedure Laterality Date    DELIVERY       HX BREAST BIOPSY Left     neg path    HX GI      reflux surgery    HX GYN       3 c-sections    HX HYSTERECTOMY      HX OTHER SURGICAL      lobectomy    HX UROLOGICAL      stone rmvl with lithotripsy    [place] CARDIAC SURG PROCEDURE UNLIST      SHARON placed 10- by Dr. Sandepe Park      cardiac stents   [address]      left upper lobe ectomy secondary to carcinoma    VT REMOVE LUNG/CHEST WALL      VASCULAR SURGERY PROCEDURE UNLIST  6-2013    fem-fem bypass    VASCULAR SURGERY PROCEDURE UNLIST      leg stents         Family History:   Problem Relation Age of Onset    Cancer Mother         colon    Cancer Father         prostate    Cancer Sister         breast    Breast Cancer Sister     Cancer Brother         non-hodgkin's lymphoma       Social History     Socioeconomic History    Marital status:      Spouse name: Not on file    Number of children: Not on file    Years of education: Not on file    Highest education level: Not on file   Occupational History    Not on file   Tobacco Use    Smoking status: Former Smoker   Substance and Sexual Activity    Alcohol use: Yes     Comment: occasional glass of wine    Drug use: No    Sexual activity: Not Currently   Other Topics Concern    Not on file   Social History Narrative    ** Merged History Encounter **          Social Determinants of Health     Financial Resource Strain:     Difficulty of Paying Living Expenses:    Food Insecurity:     Worried About Running Out of Food in the Last Year:     Ran Out of Food in the Last Year:    Transportation Needs:     Lack of Transportation (Medical):      Lack of Transportation (Non-Medical):    Physical Activity:     Days of Exercise per Week:     Minutes of Exercise per Session:    Stress:     Feeling of Stress :    Social Connections:     Frequency of Communication with Friends and Family:     Frequency of Social Gatherings with Friends and Family:     Attends Spiritism Services:     Active Member of Clubs or Organizations:     Attends Club or Organization Meetings:     Marital Status:    Intimate Partner Violence:     Fear of Current or Ex-Partner:     Emotionally Abused:     Physically Abused:     Sexually Abused: ALLERGIES: Patient has no known allergies. Review of Systems   Constitutional: Negative for fever. HENT: Negative for drooling. Respiratory: Positive for shortness of breath. Cardiovascular: Positive for chest pain. Gastrointestinal: Positive for abdominal pain. Musculoskeletal: Positive for myalgias. Negative for neck pain. Skin: Negative for rash and wound. Neurological: Negative for seizures and syncope. Psychiatric/Behavioral: The patient is nervous/anxious. Vitals:    07/21/21 1620   BP: 137/63   Pulse: 67   Resp: 16   Temp: 98.9 °F (37.2 °C)   SpO2: 99%            Physical Exam  Vitals and nursing note reviewed. Constitutional:       Comments: Elderly, deconditioned   HENT:      Head: Atraumatic. Neck:      Trachea: No tracheal deviation. Cardiovascular:      Rate and Rhythm: Normal rate and regular rhythm. Heart sounds: No friction rub. No gallop. Pulmonary:      Effort: Pulmonary effort is normal.      Breath sounds: Examination of the left-upper field reveals decreased breath sounds. Decreased breath sounds present. No wheezing, rhonchi or rales. Chest:      Chest wall: Tenderness present. No deformity. Abdominal:      Palpations: Abdomen is soft. Tenderness: There is no guarding or rebound. Musculoskeletal:      Right lower leg: No tenderness. No edema. Left lower leg: No tenderness. No edema. Skin:     General: Skin is warm and dry. Neurological:      Mental Status: She is alert and oriented to person, place, and time. Psychiatric:         Mood and Affect: Mood is anxious.          Behavior: Behavior normal.          [facility]  ED Course as of Jul 21 1813 Wed Jul 21, 2021 1812 EKG obtained at 1618 showing sinus rhythm, rate 66, T wave inversions anterolaterally, change compared to prior EKG. [JE]      ED Course User Index  [JE] Coby Rashid MD       Procedures          Perfect Serve Consult for Admission  7:28 PM    ED Room Number: ER24/24  Patient Name and age:  Bharti Pedroza 68 y.o.  female  Working Diagnosis:   1. ECG abnormal    2. Chest pain, unspecified type    3. Hypokalemia    4. Cavitary lesion of lung        COVID-19 Suspicion:  no  Sepsis present:  no  Reassessment needed: no  Code Status:  Full Code  Readmission: no  Isolation Requirements:  no  Recommended Level of Care:  telemetry  Department:[facility] Adult ED - 21   Other: Patient is a 68-year-old female with past medical history significant for coronary artery disease, and peripheral vascular disease status post femorofemoral bypass by Dr. Beatriz King in the past, lung cancer status post left upper pneumonectomy who presents with left-sided chest pain. There is some question whether or not some of this is chronic pain but patient states emphatically that this is different in pain that she had before. She does have EKG changes today with T wave inversions and slight depression in the anterior lateral leads. Troponin negative thus far. CTA does not show PE but does show persistent cavitary lesion now with increased thickness as compared to a study from February 2019. Patient denies hemoptysis. Admission for further observation in light of EKG changes.   Did not start antibiotics yet due to no white count or fever

## 2021-07-21 NOTE — ED TRIAGE NOTES
Patient arrives via EMS from home complaining of left sided chest pain radiating to left neck and left arm started around 1pm today, intermittent. Patient had similar pain last week which subsided. History of angina, left lung cancer, COPD, HTN. 1 Nitroglycerin given en route sublingual. BS en route was 116.

## 2021-07-22 VITALS
HEIGHT: 59 IN | WEIGHT: 114.2 LBS | DIASTOLIC BLOOD PRESSURE: 69 MMHG | TEMPERATURE: 98.8 F | BODY MASS INDEX: 23.02 KG/M2 | OXYGEN SATURATION: 99 % | SYSTOLIC BLOOD PRESSURE: 119 MMHG | HEART RATE: 69 BPM | RESPIRATION RATE: 17 BRPM

## 2021-07-22 PROBLEM — I20.8 ATYPICAL ANGINA (HCC): Status: RESOLVED | Noted: 2021-07-21 | Resolved: 2021-07-22

## 2021-07-22 LAB
ALBUMIN SERPL-MCNC: 3.5 G/DL (ref 3.5–5)
ALBUMIN/GLOB SERPL: 0.8 {RATIO} (ref 1.1–2.2)
ALP SERPL-CCNC: 73 U/L (ref 45–117)
ALT SERPL-CCNC: 20 U/L (ref 12–78)
ANION GAP SERPL CALC-SCNC: 6 MMOL/L (ref 5–15)
AST SERPL-CCNC: 18 U/L (ref 15–37)
BILIRUB SERPL-MCNC: 0.7 MG/DL (ref 0.2–1)
BUN SERPL-MCNC: 11 MG/DL (ref 6–20)
BUN/CREAT SERPL: 12 (ref 12–20)
CALCIUM SERPL-MCNC: 9.3 MG/DL (ref 8.5–10.1)
CHLORIDE SERPL-SCNC: 110 MMOL/L (ref 97–108)
CO2 SERPL-SCNC: 23 MMOL/L (ref 21–32)
CREAT SERPL-MCNC: 0.92 MG/DL (ref 0.55–1.02)
ERYTHROCYTE [DISTWIDTH] IN BLOOD BY AUTOMATED COUNT: 13.1 % (ref 11.5–14.5)
GLOBULIN SER CALC-MCNC: 4.2 G/DL (ref 2–4)
GLUCOSE SERPL-MCNC: 95 MG/DL (ref 65–100)
HCT VFR BLD AUTO: 34.8 % (ref 35–47)
HGB BLD-MCNC: 11.7 G/DL (ref 11.5–16)
MCH RBC QN AUTO: 30.8 PG (ref 26–34)
MCHC RBC AUTO-ENTMCNC: 33.6 G/DL (ref 30–36.5)
MCV RBC AUTO: 91.6 FL (ref 80–99)
NRBC # BLD: 0 K/UL (ref 0–0.01)
NRBC BLD-RTO: 0 PER 100 WBC
PLATELET # BLD AUTO: 201 K/UL (ref 150–400)
PMV BLD AUTO: 9 FL (ref 8.9–12.9)
POTASSIUM SERPL-SCNC: 4.8 MMOL/L (ref 3.5–5.1)
PROT SERPL-MCNC: 7.7 G/DL (ref 6.4–8.2)
RBC # BLD AUTO: 3.8 M/UL (ref 3.8–5.2)
SODIUM SERPL-SCNC: 139 MMOL/L (ref 136–145)
WBC # BLD AUTO: 6.2 K/UL (ref 3.6–11)

## 2021-07-22 PROCEDURE — 80053 COMPREHEN METABOLIC PANEL: CPT

## 2021-07-22 PROCEDURE — 96372 THER/PROPH/DIAG INJ SC/IM: CPT

## 2021-07-22 PROCEDURE — 74011250637 HC RX REV CODE- 250/637: Performed by: HOSPITALIST

## 2021-07-22 PROCEDURE — 36415 COLL VENOUS BLD VENIPUNCTURE: CPT

## 2021-07-22 PROCEDURE — 74011250637 HC RX REV CODE- 250/637: Performed by: STUDENT IN AN ORGANIZED HEALTH CARE EDUCATION/TRAINING PROGRAM

## 2021-07-22 PROCEDURE — 97535 SELF CARE MNGMENT TRAINING: CPT

## 2021-07-22 PROCEDURE — 97161 PT EVAL LOW COMPLEX 20 MIN: CPT

## 2021-07-22 PROCEDURE — 97165 OT EVAL LOW COMPLEX 30 MIN: CPT

## 2021-07-22 PROCEDURE — 97116 GAIT TRAINING THERAPY: CPT

## 2021-07-22 PROCEDURE — 74011000250 HC RX REV CODE- 250: Performed by: STUDENT IN AN ORGANIZED HEALTH CARE EDUCATION/TRAINING PROGRAM

## 2021-07-22 PROCEDURE — 74011250636 HC RX REV CODE- 250/636: Performed by: STUDENT IN AN ORGANIZED HEALTH CARE EDUCATION/TRAINING PROGRAM

## 2021-07-22 PROCEDURE — 99204 OFFICE O/P NEW MOD 45 MIN: CPT | Performed by: INTERNAL MEDICINE

## 2021-07-22 PROCEDURE — 94640 AIRWAY INHALATION TREATMENT: CPT

## 2021-07-22 PROCEDURE — 99218 HC RM OBSERVATION: CPT

## 2021-07-22 PROCEDURE — 85027 COMPLETE CBC AUTOMATED: CPT

## 2021-07-22 RX ORDER — ALPRAZOLAM 0.25 MG/1
0.25 TABLET ORAL ONCE
Status: COMPLETED | OUTPATIENT
Start: 2021-07-22 | End: 2021-07-22

## 2021-07-22 RX ADMIN — IPRATROPIUM BROMIDE 0.5 MG: 0.5 SOLUTION RESPIRATORY (INHALATION) at 07:09

## 2021-07-22 RX ADMIN — HEPARIN SODIUM 5000 UNITS: 5000 INJECTION INTRAVENOUS; SUBCUTANEOUS at 07:43

## 2021-07-22 RX ADMIN — ALPRAZOLAM 0.25 MG: 0.25 TABLET ORAL at 12:22

## 2021-07-22 RX ADMIN — CILOSTAZOL 50 MG: 50 TABLET ORAL at 10:28

## 2021-07-22 RX ADMIN — MONTELUKAST 10 MG: 10 TABLET, FILM COATED ORAL at 08:58

## 2021-07-22 RX ADMIN — ASPIRIN 81 MG: 81 TABLET, COATED ORAL at 08:58

## 2021-07-22 NOTE — PROGRESS NOTES
OCCUPATIONAL THERAPY EVALUATION/DISCHARGE  Patient: Mariam Alves (41 y.o. female)  Date: 7/22/2021  Primary Diagnosis: Atypical angina ([facility] [I20.8]       Precautions:  Fall    ASSESSMENT  Based on the objective data described below, the patient presents with decreased higher level mobility/balance, higher level activity tolerance, distractibility and anxiety; however, she is demonstrating high level of safe functional independence, completing self-care routine without DME and SBA. Pt benefits from cues for attention to task secondary to distractibility/anxiety and was educated on calming benefits of weighed blanket usage; fair understanding verbalized. Pt reports constant in-home Supervision and all DME/AE needs fulfilled within  home. Given pt's current high level of safe functional independence, DME/AE needs fulfilled, recommended level of ADL assist available within home and physical therapy following mobility/balance deficits, no other acute OT needs identified, with OT answering pt's questions/concerns and pt thanking therapist for his efforts. Current Level of Function (ADLs/self-care): SBA    Functional Outcome Measure: The patient scored 55/100 on the Barthel Index outcome measure. Other factors to consider for discharge: anxiety     PLAN :  Recommend with staff: OOB meals, Active ADL engagement, SBA toileting    Recommendation for discharge: (in order for the patient to meet his/her long term goals)  No skilled occupational therapy/ follow up rehabilitation needs identified at this time. This discharge recommendation:  Has been made in collaboration with the attending provider and/or case management    IF patient discharges home will need the following DME: patient owns DME required for discharge       SUBJECTIVE:   Patient stated I wash up at the sink now.     OBJECTIVE DATA SUMMARY:   HISTORY:   Past Medical History:   Diagnosis Date    Anxiety     CAD (coronary artery disease) 10/11/2012    Cancer (HCC)     lung    Cancer (HCC)     lung CA    Chronic obstructive pulmonary disease (HCC)     Chronic pain     legs and lower back    COPD     Depression     Dyslipidemia 10/11/2012    Endocrine disease     Gastrointestinal disorder     gastric ulcers    GERD (gastroesophageal reflux disease)     Headache(784.0)     Hematuria     Hypertension     [facility]     Malignant neoplasm of bronchus ([facility]     Meningitis 2010    Psychiatric disorder     depression    Psychiatric disorder     depression, anxiety    PUD (peptic ulcer disease)      Past Surgical History:   Procedure Laterality Date    DELIVERY       HX BREAST BIOPSY Left     neg path    HX GI      reflux surgery    HX GYN       3 c-sections    HX HYSTERECTOMY      HX OTHER SURGICAL      lobectomy    HX UROLOGICAL      stone rmvl with lithotripsy    [place] CARDIAC SURG PROCEDURE UNLIST      SHARON placed 10- by Dr. Azul Noonan      cardiac stents    Bry Curtis      left upper lobe ectomy secondary to carcinoma    SC REMOVE LUNG/CHEST WALL      VASCULAR SURGERY PROCEDURE UNLIST      fem-fem bypass    VASCULAR SURGERY PROCEDURE UNLIST      leg stents       Prior Level of Function/Environment/Context: Supervision ADLs without DME  Expanded or extensive additional review of patient history:   Home Situation  Home Environment: Private residence  # Steps to Enter: 4  Rails to Enter: No  One/Two Story Residence: Two story, live on 1st floor  Living Alone: No  Support Systems: Child(john), Family member(s)  Patient Expects to be Discharged to[de-identified] House  Current DME Used/Available at Home: [employer], straight, [address] chair, Walker, rollator, Hospital bed, Commode, bedside  Tub or Shower Type: Shower (reports bathing at sinkside)    Hand dominance: Right    EXAMINATION OF PERFORMANCE DEFICITS:  Cognitive/Behavioral Status:  Neurologic State: Alert  Orientation Level: Oriented X4  Cognition: Follows commands;Decreased attention/concentration  Perception: Appears intact  Perseveration: Perseverates during ADLS;Perseverates during conversation;Perseverates during mobility  Safety/Judgement: Awareness of environment    Hearing: Auditory  Auditory Impairment: None    Vision/Perceptual:                                Corrective Lenses: Glasses    Range of Motion:  AROM: Within functional limits  PROM: Within functional limits                      Strength:  Strength: Generally decreased, functional                Coordination:  Coordination: Within functional limits  Fine Motor Skills-Upper: Left Intact; Right Intact    Gross Motor Skills-Upper: Left Intact; Right Intact    Tone & Sensation:  Tone: Normal  Sensation: Intact                      Balance:  Sitting: Intact  Standing: Impaired; Without support  Standing - Static: Good  Standing - Dynamic : Good;Fair    Functional Mobility and Transfers for ADLs:  Bed Mobility:  Rolling: Supervision  Supine to Sit: Supervision    Transfers:  Sit to Stand: Stand-by assistance  Stand to Sit: Stand-by assistance  Bed to Chair: Stand-by assistance  Bathroom Mobility: Stand-by assistance    ADL Assessment:  Feeding: Independent    Oral Facial Hygiene/Grooming: Stand-by assistance    Bathing: Stand-by assistance    Upper Body Dressing: Independent    Lower Body Dressing: Stand-by assistance    Toileting: Stand by assistance    ADL Intervention and task modifications:  Patient instructed and indicated understanding the benefits of maintaining activity tolerance, functional mobility, and independence with self care tasks during acute stay  to ensure safe return home and to baseline. Encouraged patient to increase frequency and duration OOB, be out of bed for all meals, perform daily ADLs (as approved by RN/MD regarding bathing etc), and performing functional mobility to/from bathroom.     Pt educated on safe transfer techniques, with specific emphasis on proper hand placement to push up from seated surface rather than attempt to pull self up, fully positioning self in-front of desired seated location, feeling chair on back of legs and reaching back with 1-2 UE to slowly lower self to seated position. Cognitive Retraining  Safety/Judgement: Awareness of environment    Functional Measure:  Barthel Index:    Bathin  Bladder: 10  Bowels: 10  Groomin  Dressin  Feeding: 10  Mobility: 0  Stairs: 0  Toilet Use: 5  Transfer (Bed to Chair and Back): 10  Total: 55/100        The Barthel ADL Index: Guidelines  1. The index should be used as a record of what a patient does, not as a record of what a patient could do. 2. The main aim is to establish degree of independence from any help, physical or verbal, however minor and for whatever reason. 3. The need for supervision renders the patient not independent. 4. A patient's performance should be established using the best available evidence. Asking the patient, friends/relatives and nurses are the usual sources, but direct observation and common sense are also important. However direct testing is not needed. 5. Usually the patient's performance over the preceding 24-48 hours is important, but occasionally longer periods will be relevant. 6. Middle categories imply that the patient supplies over 50 per cent of the effort. 7. Use of aids to be independent is allowed. Suzette Johnson., Barthel, D.W. ([id number]). Functional evaluation: the Barthel Index. [facility] ([phone])2. ART Malave, Danielle Mcfarlane.[email or web address].[facility], [address] [place] [address] (). Measuring the change indisability after inpatient rehabilitation; comparison of the responsiveness of the Barthel Index and Functional [place] Measure. Journal of Neurology, Neurosurgery, and Psychiatry, 66(4), 148-646.   Matthews [place], N.[email or web address].JULY, TOMMIE Mendoza, & Enedina Real M.A. (2004.) Assessment of post-stroke quality of life in cost-effectiveness studies: The usefulness of the Barthel Index and the EuroQoL-5D. Quality of Life Research, 15, 407-89       Occupational Therapy Evaluation Charge Determination   History Examination Decision-Making   LOW Complexity : Brief history review  LOW Complexity : 1-3 performance deficits relating to physical, cognitive , or psychosocial skils that result in activity limitations and / or participation restrictions  LOW Complexity : No comorbidities that affect functional and no verbal or physical assistance needed to complete eval tasks       Based on the above components, the patient evaluation is determined to be of the following complexity level: LOW   Pain Rating:  No c/o pain    Activity Tolerance:   Fair and requires rest breaks    After treatment patient left in no apparent distress:    Call bell within reach and Sitting EOB    COMMUNICATION/EDUCATION:   The patients plan of care was discussed with: Physical therapist, Registered nurse and Case management.      Thank you for this referral.  Veena Winston OT  Time Calculation: 31 mins

## 2021-07-22 NOTE — CONSULTS
Cardiology Care Note  Initial visit    Patient Name: Miladys Beebe - DFK:[birth year] - VVW:[id number]  Primary Cardiologist: Maria Eugenia Izquierdo? Dr. Daniel Turner MD  Consulting Cardiologist: Marina Muhammad MD  Reason for Consult: chest pain    HPI:  Ms. Hien Mccall is a 68 y.o. female with PMH of CAD (s/p PCI/stent to [facility] LAD, with noted 40% RCA stesnosis), PVD s/p Left to right fem-fem bypass 2/9676 complicated by graft infection, HLD, COPD, lung c/s s/p resection, anxiety/depression, chronic low back pain, DJD with disc disease, COPD. She has history of chronic left sided chest pain. Had nuclear stress test in 5/2016 which was normal.     She presented yesterday to ER after sudden onset of  Left sided neck pain which radiated to left shoulder then to left lateral chest. She then developed midsternal chest tightness. States she has had these symptoms before but this time more severe. She took 2 tylenol but this did not help symptoms. Reports pain worse with deep breathing. Had associated nausea. No identifiable relieving factors. She reports pain is intermittent and similar to her chronic pain but yesterday pain was more severe and prompted her call to Maria Eugenia Izquierdo who called EMS. Pro BNP low for age at 1, troponins negative x2    Subjective:  Currently reports left lateral chest pain       Assessment and Plan     1. Chest pain,    -Atypical   -Reproducible   -Troponins negative x2   -CTA chest with no pulmonary embolus,    -12 lead EKG with anterior T wave abnormality/inversions also seen on prior EKGs   -No immediate cardiac testing needed. Recommend follow up with pt's primary cardiologist Dr. Daniel Turner after discharge. 2. History of CAD   -s/p PCI to mid LAD with SHARON; RCA 40% stesnosis, LCx MLI   -Echo 2016 EF 50-55%, mild AI, mild-mod MR, no need to repeat   -Continue ASA, statin.      3. History of PVD   -s/p L to R fem-fem bypass   -On cilostazol    4. GERD  5. HLD:     -Continue atorvastatin   -follow with pcp outpatient. 6. Anxiety/depression   -per primary team.    Pt was seen by Dr. Kody Thompson.  Pt presents with intermittent chest pain with atypical features. Pain is reproducible. She has ruled out for MI. Recommend outpatient followup with Dr. Edinson Forbes. Saw and evaluated pt and agree with above assessment and plan. Although pt has a h/o CAD, pain now is non cardiac, reproducible on exam. No acute ischemia by cardiac enzymes. EKG with non specific changes and T wave inversions noted in past. Compensated on exam. No additional cardiac evaluation indicated at this time. Outpt f/u with primary cardiologist Dr. Edinson Forbes. Kashmir Figueroa MD      Patient Active Problem List   Diagnosis Code    CAD (coronary artery disease) I25.10    Dyslipidemia E78.5    Claudication, intermittent ([address] I73.9    Femoral bruit R09.89    Pain R52    COPD (chronic obstructive pulmonary disease) ([address] J44.9    Lung cancer (HCC) C34.90    Atherosclerosis of native arteries of the extremities with intermittent claudication I70.219    Atypical angina ([address] I20.8     Cardiac testing:  Echo 1/2016: Procedure information: Technically limited study due to off axis window. Apical window was located 3rd intercostal extreme lateral towards back.   Left ventricle: Systolic function was normal. Ejection fraction was  estimated in the range of 50 % to 55 %. There were no regional wall motion  abnormalities. Doppler parameters were consistent with abnormal left  ventricular relaxation (grade 1 diastolic dysfunction).   Aortic valve: There was mild regurgitation.   Pulmonic valve: There was mild to moderate regurgitation. CATH: 10/10/2012L Main:MLI. LAD:Prox/Mid - 85%; ; distally mid lad has 50% stenosis and is small vessel. LCflex: YOCASTA - OM1 and OM2 - small vessel. RCA: Medium size vessel; Prox 30%/Rest - MLI.  LVEF: LVH- 45%- global  -----PCI: Prox/mid LAD - Xience SHARON - 2.25 by 18 mm    ECHO: 10/11/12: EF 60%, grade 1 DD, Small L-> R  shunt PFO, AI   1+       Review of Systems:    [] Patient unable to provide secondary to condition    CONSTITUTIONAL: anxiety     ENT/MOUTH: negative     EYES: negative    CARDIOVASCULAR: chest Pain  , SOB     RESPIRATORY: SOB with chest discomfort      GASTROINTESTINAL: nausea       GENITOURINARY: negative     MUSCULOSKELETAL: left lateral chest pain, left neck pain      SKIN: negative    Neurological: headache     PSYCHOLOGICAL: anxiety/depression       HEME/LYMPH: negative    ENDOCRINE: negative           Past Medical History:   Diagnosis Date    Anxiety     CAD (coronary artery disease) 10/11/2012    Cancer ([facility]     lung    Cancer ([facility]     lung CA    Chronic obstructive pulmonary disease (HCC)     Chronic pain     legs and lower back    COPD     Depression     Dyslipidemia 10/11/2012    Endocrine disease     Gastrointestinal disorder     gastric ulcers    GERD (gastroesophageal reflux disease)     Headache(784.0)     Hematuria     Hypertension     Lung cancer ([facility]     Malignant neoplasm of bronchus ([facility]     Meningitis 2010    Psychiatric disorder     depression    Psychiatric disorder     depression, anxiety    PUD (peptic ulcer disease)      Past Surgical History:   Procedure Laterality Date    DELIVERY       HX BREAST BIOPSY Left     neg path    HX GI      reflux surgery    HX GYN       3 c-sections    HX HYSTERECTOMY      HX OTHER SURGICAL      lobectomy    HX UROLOGICAL      stone rmvl with lithotripsy    AK CARDIAC SURG PROCEDURE UNLIST      SHARON placed 10- by Dr. Iesha De La Torre      cardiac stents    Stephany Gottron      left upper lobe ectomy secondary to carcinoma    AK REMOVE LUNG/CHEST WALL      VASCULAR SURGERY PROCEDURE UNLIST      fem-fem bypass    VASCULAR SURGERY PROCEDURE UNLIST      leg stents     Current Facility-Administered Medications   Medication Dose Route Frequency    nitroglycerin (NITROSTAT) tablet 0.4 mg  0.4 mg SubLINGual Q5MIN PRN    ALPRAZolam (XANAX) tablet 1 mg  1 mg Oral QPM    aspirin delayed-release tablet 81 mg  81 mg Oral DAILY    cilostazoL (PLETAL) tablet 50 mg  50 mg Oral DAILY    . PHARMACY TO SUBSTITUTE PER PROTOCOL (Reordered from: citalopram hydrobromide (CITALOPRAM PO))    Per Protocol    HYDROcodone-acetaminophen (NORCO) 7.5-325 mg per tablet 1 Tablet  1 Tablet Oral BID PRN    montelukast (SINGULAIR) tablet 10 mg  10 mg Oral DAILY    naproxen (NAPROSYN) tablet 375 mg  375 mg Oral BID PRN    atorvastatin (LIPITOR) tablet 20 mg  20 mg Oral QHS    ipratropium (ATROVENT) 0.02 % nebulizer solution 0.5 mg  0.5 mg Nebulization DAILY    traZODone (DESYREL) tablet 50 mg  50 mg Oral QHS    sodium chloride (NS) flush 5-40 mL  5-40 mL IntraVENous Q8H    sodium chloride (NS) flush 5-40 mL  5-40 mL IntraVENous PRN    acetaminophen (TYLENOL) tablet 650 mg  650 mg Oral Q6H PRN    Or    acetaminophen (TYLENOL) suppository 650 mg  650 mg Rectal Q6H PRN    polyethylene glycol (MIRALAX) packet 17 g  17 g Oral DAILY PRN    ondansetron (ZOFRAN ODT) tablet 4 mg  4 mg Oral Q8H PRN    Or    ondansetron (ZOFRAN) injection 4 mg  4 mg IntraVENous Q6H PRN    heparin (porcine) injection 5,000 Units  5,000 Units SubCUTAneous Q8H       No Known Allergies   Family History   Problem Relation Age of Onset    Cancer Mother         colon    Cancer Father         prostate    Cancer Sister         breast    Breast Cancer Sister     Cancer Brother         non-hodgkin's lymphoma      Social History     Socioeconomic History    Marital status:      Spouse name: Not on file    Number of children: Not on file    Years of education: Not on file    Highest education level: Not on file   Tobacco Use    Smoking status: Former Smoker   Substance and Sexual Activity    Alcohol use: Yes     Comment: occasional glass of wine    Drug use: No    Sexual activity: Not Currently   Social History Narrative    ** Merged History Encounter **          Social Determinants of Health     Financial Resource Strain:     Difficulty of Paying Living Expenses:    Food Insecurity:     Worried About Running Out of Food in the Last Year:     Ran Out of Food in the Last Year:    Transportation Needs:     Lack of Transportation (Medical):  Lack of Transportation (Non-Medical):    Physical Activity:     Days of Exercise per Week:     Minutes of Exercise per Session:    Stress:     Feeling of Stress :    Social Connections:     Frequency of Communication with Friends and Family:     Frequency of Social Gatherings with Friends and Family:     Attends Temple Services:     Active Member of Clubs or Organizations:     Attends Club or Organization Meetings:     Marital Status:        Objective:    Physical Exam    Vitals:   Vitals:    07/21/21 2324 07/22/21 0257 07/22/21 0710 07/22/21 0847   BP: (!) 145/73 (!) 144/72  119/69   Pulse: 72 78  69   Resp: 20 18  17   Temp: 98.4 °F (36.9 °C) 98.2 °F (36.8 °C)  98.8 °F (37.1 °C)   SpO2: 98% 98% 98% 99%   Weight:       Height:           General:    Alert, cooperative, no distress, appears stated age. Neck:   Supple, no carotid bruit and no JVD. Back:     Symmetric,     Lungs:     clear to auscultation bilaterally. Chest wall: sternum, left chest wall ttp   Heart[de-identified]    Regular rate and rhythm, S1, S2 normal, I/VI systolic murmur LUSB. no rub or gallop. Abdomen:     Soft, non-tender. Bowel sounds normal.    Extremities:   Extremities normal, atraumatic, no cyanosis or edema. Vascular:   Pulses - 2+ radial   Skin:   Skin color normal. No rashes or lesions on visible areas   Neurologic:   CN II-XII grossly intact.         Telemetry: normal sinus rhythm    ECG:   EKG Results     Procedure 720 Value Units Date/Time    EKG, 12 LEAD, INITIAL [[id number]] Collected: 07/21/21 7701    Order Status: Completed Updated: 07/21/21 2147     Ventricular Rate 66 BPM      Atrial Rate 66 BPM      P-R Interval 112 ms      QRS Duration 84 ms      Q-T Interval 458 ms      QTC Calculation (Bezet) 480 ms      Calculated P Axis 14 degrees      Calculated R Axis -14 degrees      Calculated T Axis -61 degrees      Diagnosis --     Normal sinus rhythm  ST & T wave abnormality, consider anterolateral ischemia  When compared with ECG of 11-MAR-2016 17:29,  Nonspecific T wave abnormality, worse in Inferior leads  T wave inversion now evident in Lateral leads  Confirmed by India Dior MD ([id number]) on 7/21/2021 9:47:24 PM            Data Review:     Radiology:   XR Results (most recent):  [place] from [place] on 07/21/21    XR CHEST PORT    Narrative  EXAM:  XR CHEST PORT    INDICATION:  cp    COMPARISON:  2019    FINDINGS: A portable AP radiograph of the chest was obtained at 1634 hours. Postoperative changes are stable. .  Lungs are clear of an acute process. . There  is questionable small nodule at the right lung base. Heart size is stable. .  Bony structures are unchanged. Impression  No acute abnormality identified. There is question of a small nodule  right lung base which can be followed up on chest CT. Recent Labs     07/21/21 2022 07/21/21  1653   TROIQ <0.05 <0.05     Recent Labs     07/22/21  0454 07/21/21  1653    140   K 4.8 3.3*   * 110*   CO2 23 22   BUN 11 11   CREA 0.92 0.73   GLU 95 54*   CA 9.3 8.8     Recent Labs     07/22/21  0454 07/21/21  1653   WBC 6.2 7.2   HGB 11.7 11.4*   HCT 34.8* 33.5*    212     Recent Labs     07/22/21  0454 07/21/21  1653   AP 73 75     No results for input(s): CHOL, LDLC in the last 72 hours. No lab exists for component: TGL, HDLC,  HBA1C  No results for input(s): CRP, TSH, TSHEXT in the last 72 hours.     No lab exists for component: ESR      [employer], ANP-BC  Korin Nielson MD        [facility]  [address] [facility] [address]  [place]  [phone], Provider, MD

## 2021-07-22 NOTE — DISCHARGE SUMMARY
Discharge Summary       PATIENT ID: Alena Gomez  MRN: [id number]   YOB: 1940    DATE OF ADMISSION: 7/21/2021  4:08 PM    DATE OF DISCHARGE: 7/22/21   PRIMARY CARE PROVIDER: Unknown, Provider, MD     ATTENDING PHYSICIAN: Herrera Collado  DISCHARGING PROVIDER: Alice Snowden MD    To contact this individual call [phone] and ask the  to page. If unavailable ask to be transferred the Adult Hospitalist Department. CONSULTATIONS: IP CONSULT TO HOSPITALIST  IP CONSULT TO CARDIOLOGY    PROCEDURES/SURGERIES: * No surgery found *    ADMITTING [facility] COURSE:   Alena Gomez is a 68 y.o. female with past medical history of lung cancer status post resection, COPD, CAD status post PCI, dyslipidemia, GERD, major depressive disorder who presents to hospital with complaint of left-sided chest pain. History is somewhat difficult to obtain from patient given entry state. Daughter at bedside. Patient and daughter report left-sided chest pain that has been ongoing for several years. Patient unable to differentiate current symptoms from chronic symptoms. She reports left-sided aching, sharp and pressure pain involving her chest wall and posterior left upper ribs. She has had extensive cardiac work-up in the past which has been negative and was referred to pain management. Patient states she has been prescribed different medications by pain management but did not take them as they did not make her feel well. DISCHARGE DIAGNOSES / PLAN:      1. Seen by cardiology out pt f/u recommended     ADDITIONAL CARE RECOMMENDATIONS:        PENDING TEST RESULTS:   At the time of discharge the following test results are still pending:     FOLLOW UP APPOINTMENTS:    Follow-up Information     Follow up With Specialties Details Why Contact Tk Dove MD Cardiology In 1 week follow up with Dr. Amari Melton in 1-2 weeks. Call to schedule appointment.  [address] [facility] [place] [address]  [facility]  [phone]               DIET: Cardiac Diet    ACTIVITY: Activity as tolerated    WOUND CARE:     EQUIPMENT needed:       DISCHARGE MEDICATIONS:  Current Discharge Medication List      CONTINUE these medications which have NOT CHANGED    Details   omeprazole (PRILOSEC) 20 mg capsule Take 20 mg by mouth daily. naproxen (NAPROSYN) 375 mg tablet Take 375 mg by mouth as needed. cilostazoL (PLETAL) 50 mg tablet TAKE 1 TABLET BY MOUTH ONCE DAILY      citalopram hydrobromide (CITALOPRAM PO) Take  by mouth daily. Indications: Patient unsure of the dosage      ondansetron hcl (ZOFRAN, AS HYDROCHLORIDE,) 4 mg tablet Take 4 mg by mouth every eight (8) hours as needed for Nausea. Associated Diagnoses: Coronary artery disease involving native coronary artery of native heart without angina pectoris      montelukast (SINGULAIR) 10 mg tablet Take 10 mg by mouth daily. aspirin delayed-release 81 mg tablet Take  by mouth daily. simvastatin (ZOCOR) 40 mg tablet Take 40 mg by mouth nightly. ALPRAZolam (XANAX) 1 mg tablet Take 1 mg by mouth every evening. traZODone (DESYREL) 100 mg tablet Take 50 mg by mouth nightly. tiotropium (SPIRIVA WITH HANDIHALER) 18 mcg inhalation capsule Take 1 Cap by inhalation daily. !! clopidogrel (PLAVIX) 75 mg tab TAKE 1 TABLET BY MOUTH DAILY  Qty: 30 Tab, Refills: 3      HYDROcodone-acetaminophen (NORCO) 7.5-325 mg per tablet Take  by mouth two (2) times daily as needed for Pain. Associated Diagnoses: Coronary artery disease involving native coronary artery of native heart without angina pectoris      !! clopidogrel (PLAVIX) 75 mg tab TAKE 1 TABLET BY MOUTH DAILY  Qty: 90 Tab, Refills: 11    Comments: **Patient requests 90 days supply**      pantoprazole (PROTONIX) 20 mg tablet Take 20 mg by mouth daily. cholecalciferol, vitamin D3, 2,000 unit tab Take  by mouth every other day.       polyethylene glycol (MIRALAX) 17 gram/dose powder Take 17 g by mouth every other day. QUEtiapine (SEROQUEL) 100 mg tablet Take 50 mg by mouth nightly. escitalopram oxalate (LEXAPRO) 10 mg tablet Take 10 mg by mouth daily. promethazine (PHENERGAN) 25 mg tablet Take 1 tablet by mouth every six (6) hours as needed for Nausea. Qty: 15 tablet, Refills: 0       !! - Potential duplicate medications found. Please discuss with provider. NOTIFY YOUR PHYSICIAN FOR ANY OF THE FOLLOWING:   Fever over 101 degrees for 24 hours. Chest pain, shortness of breath, fever, chills, nausea, vomiting, diarrhea, change in mentation, falling, weakness, bleeding. Severe pain or pain not relieved by medications. Or, any other signs or symptoms that you may have questions about. DISPOSITION:   x Home With:   OT  PT  HH  RN       Long term SNF/Inpatient Rehab    Independent/assisted living    Hospice    Other:       PATIENT CONDITION AT DISCHARGE:     Functional status    Poor     Deconditioned    x Independent      Cognition    x Lucid     Forgetful     Dementia      Catheters/lines (plus indication)    Adams     PICC     PEG    x None      Code status   x  Full code     DNR      PHYSICAL EXAMINATION AT DISCHARGE:  General:          Alert, cooperative, no distress, appears stated age. HEENT:           Atraumatic, anicteric sclerae, pink conjunctivae                          No oral ulcers, mucosa moist, throat clear, dentition fair  Neck:               Supple, symmetrical  Lungs:             Clear to auscultation bilaterally. No Wheezing or Rhonchi. No rales. Chest wall:      No tenderness  No Accessory muscle use. Heart:              Regular  rhythm,  No  murmur   No edema  Abdomen:        Soft, non-tender. Not distended. Bowel sounds normal  Extremities:     No cyanosis. No clubbing,                            Skin turgor normal, Capillary refill normal  Skin:                Not pale.   Not Jaundiced  No rashes   Psych: Not anxious or agitated.   Neurologic:      Alert, moves all extremities, answers questions appropriately and responds to commands       CHRONIC MEDICAL DIAGNOSES:  Problem List as of 7/22/2021 Date Reviewed: 7/22/2021        Codes Class Noted - Resolved    Atherosclerosis of native arteries of the extremities with intermittent claudication ICD-10-CM: I70.219  ICD-9-CM: 440.21  11/9/2012 - Present    Overview Signed 11/9/2012 11:11 AM by Brayan Ernandez MD     R VALDO 0.58             Claudication, intermittent ([facility] ICD-10-CM: I73.9  ICD-9-CM: 443.9  10/23/2012 - Present        Femoral bruit ICD-10-CM: R09.89  ICD-9-CM: 785.9  10/23/2012 - Present        Pain ICD-10-CM: R52  ICD-9-CM: 780.96  10/23/2012 - Present    Overview Signed 10/23/2012 10:56 AM by MD HERNAN Alejo groin             COPD (chronic obstructive pulmonary disease) ([facility] ICD-10-CM: J44.9  ICD-9-CM: 711  10/23/2012 - Present        Lung cancer ([facility] ICD-10-CM: C34.90  ICD-9-CM: 162.9  10/23/2012 - Present    Overview Signed 10/23/2012 11:00 AM by Brayan Ernandez MD     S/p resection 2010             CAD (coronary artery disease) ICD-10-CM: I25.10  ICD-9-CM: 414.00  10/11/2012 - Present        Dyslipidemia (Chronic) ICD-10-CM: E78.5  ICD-9-CM: 272.4  10/11/2012 - Present        RESOLVED: Atypical angina ([facility] ICD-10-CM: I20.8  ICD-9-CM: 413.9  7/21/2021 - 7/22/2021        RESOLVED: Chest pain at rest ICD-10-CM: R07.9  ICD-9-CM: 786.50  10/9/2012 - 10/11/2012              Greater than 30  minutes were spent with the patient on counseling and coordination of care    Signed:   Olamide Abdalla MD  7/22/2021  11:43 AM

## 2021-07-22 NOTE — H&P
History & Physical    Primary Care Provider: Unknown, Provider, MD  Source of Information: Patient and chart review    History of Presenting Illness:   Miladys Beebe is a 68 y.o. female with past medical history of lung cancer status post resection, COPD, CAD status post PCI, dyslipidemia, GERD, major depressive disorder who presents to hospital with complaint of left-sided chest pain. History is somewhat difficult to obtain from patient given entry state. Daughter at bedside. Patient and daughter report left-sided chest pain that has been ongoing for several years. Patient unable to differentiate current symptoms from chronic symptoms. She reports left-sided aching, sharp and pressure pain involving her chest wall and posterior left upper ribs. She has had extensive cardiac work-up in the past which has been negative and was referred to pain management. Patient states she has been prescribed different medications by pain management but did not take them as they did not make her feel well. The patient denies any fever, chills, cough, congestion, recent illness, palpitations, or dysuria. Vitals on ER presentation remarkable for BP to 150/63. Labs remarkable for potassium of 3.3. Initial troponin is negative. CTA chest showed no PE. Chronic cavitary lesion left lower lobe. CT abdomen pelvis showed atherosclerosis with occlusion/narrowing of right common iliac artery. Review of Systems:  A comprehensive review of systems was negative except for that written in the History of Present Illness.      Past Medical History:   Diagnosis Date    Anxiety     CAD (coronary artery disease) 10/11/2012    Cancer ([facility]     lung    Cancer ([facility]     lung CA    Chronic obstructive pulmonary disease (HCC)     Chronic pain     legs and lower back    COPD     Depression     Dyslipidemia 10/11/2012    Endocrine disease     Gastrointestinal disorder     gastric ulcers    GERD (gastroesophageal reflux disease)     Headache(784.0)     Hematuria     Hypertension     [facility]     Malignant neoplasm of bronchus ([facility]     Meningitis 2010    Psychiatric disorder     depression    Psychiatric disorder     depression, anxiety    PUD (peptic ulcer disease)       Past Surgical History:   Procedure Laterality Date    DELIVERY       HX BREAST BIOPSY Left     neg path    HX GI      reflux surgery    HX GYN       3 c-sections    HX HYSTERECTOMY      HX OTHER SURGICAL      lobectomy    HX UROLOGICAL      stone rmvl with lithotripsy    ME CARDIAC SURG PROCEDURE UNLIST      SHARON placed 10- by Dr. Dixie Lo      cardiac stents     [facility]      left upper lobe ectomy secondary to carcinoma    ME REMOVE LUNG/CHEST WALL      VASCULAR SURGERY PROCEDURE UNLIST      fem-fem bypass    VASCULAR SURGERY PROCEDURE UNLIST      leg stents     Prior to Admission medications    Medication Sig Start Date End Date Taking? Authorizing Provider   clopidogrel (PLAVIX) 75 mg tab TAKE 1 TABLET BY MOUTH DAILY 17   Nabil Gregg MD   HYDROcodone-acetaminophen (NORCO) 7.5-325 mg per tablet Take  by mouth two (2) times daily as needed for Pain. Provider, Historical   ondansetron hcl (ZOFRAN, AS HYDROCHLORIDE,) 4 mg tablet Take 4 mg by mouth every eight (8) hours as needed for Nausea. Provider, Historical   clopidogrel (PLAVIX) 75 mg tab TAKE 1 TABLET BY MOUTH DAILY 5/10/17   Martín Alonso MD   montelukast (SINGULAIR) 10 mg tablet Take 10 mg by mouth daily. Provider, Historical   pantoprazole (PROTONIX) 20 mg tablet Take 20 mg by mouth daily. Provider, Historical   cholecalciferol, vitamin D3, 2,000 unit tab Take  by mouth every other day. Provider, Historical   polyethylene glycol (MIRALAX) 17 gram/dose powder Take 17 g by mouth every other day.     Provider, Historical   aspirin delayed-release 81 mg tablet Take  by mouth daily. Provider, Historical   QUEtiapine (SEROQUEL) 100 mg tablet Take 50 mg by mouth nightly. Hugh Akers MD   escitalopram oxalate (LEXAPRO) 10 mg tablet Take 10 mg by mouth daily. Provider, Historical   simvastatin (ZOCOR) 40 mg tablet Take 40 mg by mouth nightly. Hugh Akers MD   promethazine (PHENERGAN) 25 mg tablet Take 1 tablet by mouth every six (6) hours as needed for Nausea. 11/25/14   Raghav Chawla MD   ALPRAZolam Drucella Becka) 1 mg tablet Take 0.5 mg by mouth three (3) times daily. Hugh Akers MD   traZODone (DESYREL) 100 mg tablet Take 50 mg by mouth nightly. Hugh Akers MD   tiotropium (SPIRIVA WITH HANDIHALER) 18 mcg inhalation capsule Take 1 Cap by inhalation daily. Hugh Akers MD     No Known Allergies   Family History   Problem Relation Age of Onset   [facility] Cancer Mother         colon    Cancer Father         prostate    Cancer Sister         breast    Breast Cancer Sister     Cancer Brother         non-hodgkin's lymphoma        SOCIAL HISTORY:  Patient resides:  Independently x   Assisted Living    SNF    With family care       Smoking history:   None x   Former    Chronic      Alcohol history:   None x   Social    Chronic      Ambulates:   Independently x   w/cane    w/walker    w/wc    CODE STATUS:  DNR    Full x   Other      Objective:     Physical Exam:     Visit Vitals  BP (!) 152/68   Pulse 78   Temp 98.9 °F (37.2 °C)   Resp 26   SpO2 99%      O2 Device: None (Room air)    General:  Alert, cooperative, no distress, appears stated age. Head:  Normocephalic, without obvious abnormality, atraumatic. Eyes:  Conjunctivae/corneas clear. PERRL, EOMs intact. Nose: Nares normal. Septum midline. Mucosa normal.        Neck: Supple, symmetrical, trachea midline, no carotid bruit and no JVD. Lungs:   Clear to auscultation bilaterally.    Chest wall:   Left upper chest wall diffusely tender to palpation   Heart:  Regular rate and rhythm, S1, S2 normal   Abdomen:   Soft, non-tender. Bowel sounds normal. No masses,  No organomegaly. Extremities: Extremities normal, atraumatic, no cyanosis or edema. Pulses: 2+ and symmetric all extremities. Skin: Skin color, texture, turgor normal. No rashes or lesions   Neurologic: CNII-XII intact. EKG: Normal sinus rhythm. Nonspecific ST-T wave changes. Data Review:     Recent Days:  Recent Labs     07/21/21  1653   WBC 7.2   HGB 11.4*   HCT 33.5*        Recent Labs     07/21/21  1653      K 3.3*   *   CO2 22   GLU 54*   BUN 11   CREA 0.73   CA 8.8   MG 1.8   ALB 3.2*   ALT 18     No results for input(s): PH, PCO2, PO2, HCO3, FIO2 in the last 72 hours.     24 Hour Results:  Recent Results (from the past 24 hour(s))   EKG, 12 LEAD, INITIAL    Collection Time: 07/21/21  4:18 PM   Result Value Ref Range    Ventricular Rate 66 BPM    Atrial Rate 66 BPM    P-R Interval 112 ms    QRS Duration 84 ms    Q-T Interval 458 ms    QTC Calculation (Bezet) 480 ms    Calculated P Axis 14 degrees    Calculated R Axis -14 degrees    Calculated T Axis -61 degrees    Diagnosis       Normal sinus rhythm  ST & T wave abnormality, consider anterolateral ischemia  When compared with ECG of 11-MAR-2016 17:29,  Nonspecific T wave abnormality, worse in Inferior leads  T wave inversion now evident in Lateral leads     CBC WITH AUTOMATED DIFF    Collection Time: 07/21/21  4:53 PM   Result Value Ref Range    WBC 7.2 3.6 - 11.0 K/uL    RBC 3.70 (L) 3.80 - 5.20 M/uL    HGB 11.4 (L) 11.5 - 16.0 g/dL    HCT 33.5 (L) 35.0 - 47.0 %    MCV 90.5 80.0 - 99.0 FL    MCH 30.8 26.0 - 34.0 PG    MCHC 34.0 30.0 - 36.5 g/dL    RDW 12.7 11.5 - 14.5 %    PLATELET 226 213 - 435 K/uL    MPV 9.6 8.9 - 12.9 FL    NRBC 0.0 0  WBC    ABSOLUTE NRBC 0.00 0.00 - 0.01 K/uL    NEUTROPHILS 50 32 - 75 %    LYMPHOCYTES 37 12 - 49 %    MONOCYTES 11 5 - 13 %    EOSINOPHILS 1 0 - 7 %    BASOPHILS 1 0 - 1 %    IMMATURE GRANULOCYTES 0 0.0 - 0.5 %    ABS. NEUTROPHILS 3.6 1.8 - 8.0 K/UL    ABS. LYMPHOCYTES 2.7 0.8 - 3.5 K/UL    ABS. MONOCYTES 0.8 0.0 - 1.0 K/UL    ABS. EOSINOPHILS 0.1 0.0 - 0.4 K/UL    ABS. BASOPHILS 0.0 0.0 - 0.1 K/UL    ABS. IMM. GRANS. 0.0 0.00 - 0.04 K/UL    DF AUTOMATED     METABOLIC PANEL, COMPREHENSIVE    Collection Time: 07/21/21  4:53 PM   Result Value Ref Range    Sodium 140 136 - 145 mmol/L    Potassium 3.3 (L) 3.5 - 5.1 mmol/L    Chloride 110 (H) 97 - 108 mmol/L    CO2 22 21 - 32 mmol/L    Anion gap 8 5 - 15 mmol/L    Glucose 54 (L) 65 - 100 mg/dL    BUN 11 6 - 20 MG/DL    Creatinine 0.73 0.55 - 1.02 MG/DL    BUN/Creatinine ratio 15 12 - 20      GFR est AA >60 >60 ml/min/1.73m2    GFR est non-AA >60 >60 ml/min/1.73m2    Calcium 8.8 8.5 - 10.1 MG/DL    Bilirubin, total 0.5 0.2 - 1.0 MG/DL    ALT (SGPT) 18 12 - 78 U/L    AST (SGOT) 20 15 - 37 U/L    Alk. phosphatase 75 45 - 117 U/L    Protein, total 7.3 6.4 - 8.2 g/dL    Albumin 3.2 (L) 3.5 - 5.0 g/dL    Globulin 4.1 (H) 2.0 - 4.0 g/dL    A-G Ratio 0.8 (L) 1.1 - 2.2     NT-PRO BNP    Collection Time: 07/21/21  4:53 PM   Result Value Ref Range    NT pro- <450 PG/ML   TROPONIN I    Collection Time: 07/21/21  4:53 PM   Result Value Ref Range    Troponin-I, Qt. <0.05 <0.05 ng/mL   LIPASE    Collection Time: 07/21/21  4:53 PM   Result Value Ref Range    Lipase 108 73 - 393 U/L   MAGNESIUM    Collection Time: 07/21/21  4:53 PM   Result Value Ref Range    Magnesium 1.8 1.6 - 2.4 mg/dL   SAMPLES BEING HELD    Collection Time: 07/21/21  4:53 PM   Result Value Ref Range    SAMPLES BEING HELD 1RED     COMMENT        Add-on orders for these samples will be processed based on acceptable specimen integrity and analyte stability, which may vary by analyte.    URINALYSIS W/MICROSCOPIC    Collection Time: 07/21/21  7:24 PM   Result Value Ref Range    Color YELLOW/STRAW      Appearance CLOUDY (A) CLEAR      Specific gravity 1.022 1.003 - 1.030      pH (UA) 8.5 (H) 5.0 - 8.0      Protein Negative NEG mg/dL    Glucose Negative NEG mg/dL    Ketone Negative NEG mg/dL    Bilirubin Negative NEG      Blood Negative NEG      Urobilinogen 1.0 0.2 - 1.0 EU/dL    Nitrites Negative NEG      Leukocyte Esterase Negative NEG      WBC 0-4 0 - 4 /hpf    RBC 0-5 0 - 5 /hpf    Epithelial cells FEW FEW /lpf    Bacteria Negative NEG /hpf    Hyaline cast 0-2 0 - 5 /lpf   URINE CULTURE HOLD SAMPLE    Collection Time: 07/21/21  7:24 PM    Specimen: Serum; Urine   Result Value Ref Range    Urine culture hold        Urine on hold in Microbiology dept for 2 days. If unpreserved urine is submitted, it cannot be used for addtional testing after 24 hours, recollection will be required. Imaging:     Assessment:     Renny Dean is a 68 y.o. female with past medical history of lung cancer status post resection, COPD, CAD status post PCI, dyslipidemia, GERD, major depressive disorder who is admitted for angina. Plan:       Atypical angina  -Initial troponin negative x2, CTA chest without acute pathology  -Reproducible chest pain on palpation  -Admit monitoring observation  -Pain management with NSAIDs and as needed Norco  -Additional cardiac work-up per cardiology consultant  -Monitor on telemetry with serial tropes    Major depressive disorder and generalized anxiety disorder  -Continue citalopram and as needed Xanax    COPD  -Not in acute exacerbation  -DuoNebs as needed    GERD  -Home Protonix    Cavitary lung lesion  -Chronic and stable.   No acute management  -Pulmonology follow-up outpatient                FEN/GI -p.o. hydration  Activity -as tolerated  DVT prophylaxis -SCDs  GI prophylaxis -not indicated  Disposition -home    CODE STATUS: Full code       Signed By: Baljit Christianson MD     July 21, 2021

## 2021-07-22 NOTE — PROGRESS NOTES
PHYSICAL THERAPY EVALUATION/DISCHARGE  Patient: Ralph Arambula (44 y.o. female)  Date: 7/22/2021  Primary Diagnosis: Atypical angina ([facility] [facility] [I20.8]       Precautions:   Fall      ASSESSMENT  Based on the objective data described below, the patient presents with anxiety, decreased attention/concentration, distractability, generalized weakness, decreased activity tolerance and impaired standing balance/gait. Pt reports history of 2 falls over past year. Patient has rollator and cane but states she does not need assistive device. Instructed patient on benefits of home PT - to increase strength and endurance and decrease fall risk. Patient has tendency to reach out to walls or furniture when walking. Pt appears close to baseline function - clear for discharge from PT standpoint - Recommend home PT. Functional Outcome Measure: The patient scored 60/100 on the Barthel Index outcome measure. Other factors to consider for discharge: lives with daughter, 4 steps without rails to enter home - per pt only performs with daughter assist     Further skilled acute physical therapy is not indicated at this time. PLAN :  Recommendation for discharge: (in order for the patient to meet his/her long term goals)  Physical therapy at least 2 days/week in the home     This discharge recommendation:  Has been made in collaboration with the attending provider and/or case management    IF patient discharges home will need the following DME: patient owns DME required for discharge       SUBJECTIVE:   Patient stated I just don't feel comfortable with this plan to discharge me today.     OBJECTIVE DATA SUMMARY:   HISTORY:    Past Medical History:   Diagnosis Date    Anxiety     CAD (coronary artery disease) 10/11/2012    Cancer ([facility] [facility]     lung    Cancer ([facility]     lung CA    Chronic obstructive pulmonary disease (HCC)     Chronic pain     legs and lower back    COPD     Depression     Dyslipidemia 10/11/2012    Endocrine disease     Gastrointestinal disorder     gastric ulcers    GERD (gastroesophageal reflux disease)     Headache(784.0)     Hematuria     Hypertension     Lung cancer ([facility]     Malignant neoplasm of bronchus (HCC)     Meningitis 2010    Psychiatric disorder     depression    Psychiatric disorder     depression, anxiety    PUD (peptic ulcer disease)      Past Surgical History:   Procedure Laterality Date    DELIVERY       HX BREAST BIOPSY Left     neg path    HX GI      reflux surgery    HX GYN       3 c-sections    HX HYSTERECTOMY      HX OTHER SURGICAL      lobectomy    HX UROLOGICAL      stone rmvl with lithotripsy    CA CARDIAC SURG PROCEDURE UNLIST      SHARON placed 10- by Dr. Azul Noonan      cardiac stents     [facility]      left upper lobe ectomy secondary to carcinoma    CA REMOVE LUNG/CHEST WALL      VASCULAR SURGERY PROCEDURE UNLIST      fem-fem bypass    VASCULAR SURGERY PROCEDURE UNLIST      leg stents       Prior level of function: Indep without assistive device, assist for stairs  Personal factors and/or comorbidities impacting plan of care: anxiety    Home Situation  Home Environment: Private residence  # Steps to Enter: 4  Rails to Enter: No  One/Two Story Residence: Two story, live on 1st floor  Living Alone: No  Support Systems: Child(john), Family member(s)  Patient Expects to be Discharged to[de-identified] House  Current DME Used/Available at Home: [employer], straight, Shower chair, Walker, rollator, Hospital bed, Commode, bedside  Tub or Shower Type: Shower (reports bathing at sinkside)    EXAMINATION/PRESENTATION/DECISION MAKING:   Critical Behavior:  Neurologic State: Alert  Orientation Level: Oriented X4  Cognition: Follows commands, Decreased attention/concentration  Safety/Judgement: Awareness of environment  Hearing:   Auditory  Auditory Impairment: None  Range Of Motion:  AROM: Within functional limits           PROM: Within functional limits           Strength:    Strength: Generally decreased, functional                    Tone & Sensation:   Tone: Normal              Sensation: Intact               Coordination:  Coordination: Within functional limits  Vision:   Corrective Lenses: Glasses  Functional Mobility:  Bed Mobility:  Rolling: Supervision  Supine to Sit: Supervision        Transfers:  Sit to Stand: Stand-by assistance  Stand to Sit: Stand-by assistance        Bed to Chair: Stand-by assistance              Balance:   Sitting: Intact; Without support  Standing: Impaired; Without support  Standing - Static: Good;Constant support  Standing - Dynamic : Good;Fair  Ambulation/Gait Training:  Distance (ft): 100 Feet (ft)  Assistive Device: Gait belt  Ambulation - Level of Assistance: Stand-by assistance        Gait Abnormalities: Decreased step clearance; Path deviations (reaching out to walls)        Base of Support: Narrowed     Speed/Viktoria: Slow  Step Length: Right shortened;Left shortened            Stairs:  Number of Stairs Trained: 4  Stairs - Level of Assistance: Contact guard assistance   Rail Use: None (Hand held min assist x 1 )    Functional Measure:  Barthel Index:    Bathin  Bladder: 10  Bowels: 10  Groomin  Dressin  Feeding: 10  Mobility: 0  Stairs: 5  Toilet Use: 5  Transfer (Bed to Chair and Back): 10  Total: 60/100       The Barthel ADL Index: Guidelines  1. The index should be used as a record of what a patient does, not as a record of what a patient could do. 2. The main aim is to establish degree of independence from any help, physical or verbal, however minor and for whatever reason. 3. The need for supervision renders the patient not independent. 4. A patient's performance should be established using the best available evidence. Asking the patient, friends/relatives and nurses are the usual sources, but direct observation and common sense are also important.  However direct testing is not needed. 5. Usually the patient's performance over the preceding 24-48 hours is important, but occasionally longer periods will be relevant. 6. Middle categories imply that the patient supplies over 50 per cent of the effort. 7. Use of aids to be independent is allowed. Esthela Hurst., Barthel, D.W. (7061). Functional evaluation: the Barthel Index. [facility] (14)2. ART Fallon, Ashish Marks., Remigio Boas., [place], [address] [place] [address] (1999). Measuring the change indisability after inpatient rehabilitation; comparison of the responsiveness of the Barthel Index and Functional [place] Measure. Journal of Neurology, Neurosurgery, and Psychiatry, 66(4), 154-163. Brittany Orozco NJOSS.JULY, TOMMIE Mendoza, & Roshni Glover MERICA. (2004.) Assessment of post-stroke quality of life in cost-effectiveness studies: The usefulness of the Barthel Index and the EuroQoL-5D. Quality of Life Research, 15, 259-57        Physical Therapy Evaluation Charge Determination   History Examination Presentation Decision-Making   LOW Complexity : Zero comorbidities / personal factors that will impact the outcome / POC LOW Complexity : 1-2 Standardized tests and measures addressing body structure, function, activity limitation and / or participation in recreation  LOW Complexity : Stable, uncomplicated  LOW Complexity : FOTO score of       Based on the above components, the patient evaluation is determined to be of the following complexity level: LOW     Pain Rating:  Pt c/o abdominal discomfort    Activity Tolerance:   Fair - pt likes to sit edge of bed or bedside chair; amb with standby assist to bathroom      After treatment patient left in no apparent distress:   Sitting in chair and Call bell within reach    COMMUNICATION/EDUCATION:   The patients plan of care was discussed with: Registered nurse.      Fall prevention education was provided and the patient/caregiver indicated understanding., Patient/family have participated as able in goal setting and plan of care. and Patient/family agree to work toward stated goals and plan of care.     Thank you for this referral.  Padmini Sinclair, PT   Time Calculation: 25 mins

## 2021-07-22 NOTE — PROGRESS NOTES
TRANSFER - IN REPORT:    Verbal report received from [place] (name) on [address]  being received from ED (unit) for routine progression of care      Report consisted of patients Situation, Background, Assessment and   Recommendations(SBAR). Information from the following report(s) SBAR, Kardex, Intake/Output, MAR and Recent Results was reviewed with the receiving nurse. Opportunity for questions and clarification was provided. Assessment completed upon patients arrival to unit and care assumed.

## 2021-07-22 NOTE — ROUTINE PROCESS
TRANSFER - OUT REPORT:    Verbal report given to 59 Betty Lopez RN(name) on [address]  being transferred to [facility](unit) for routine progression of care       Report consisted of patients Situation, Background, Assessment and   Recommendations(SBAR). Information from the following report(s) SBAR, ED Summary, [facility] and Recent Results was reviewed with the receiving nurse. Lines:   Peripheral IV 07/21/21 Left Forearm (Active)   Site Assessment Clean, dry, & intact 07/21/21 1611   Phlebitis Assessment 0 07/21/21 1611   Infiltration Assessment 0 07/21/21 1611   Dressing Status Clean, dry, & intact 07/21/21 1611   Dressing Type Transparent 07/21/21 1611   Hub Color/Line Status Pink 07/21/21 1611        Opportunity for questions and clarification was provided.       Patient transported with:  [employer]

## 2021-07-22 NOTE — PROGRESS NOTES
RN informed Dr. Deep Beaver that patient was requesting anti-anxiety medication. Order received for xanax 0.25 mg PO once.

## 2021-07-22 NOTE — PROGRESS NOTES
Primary Nurse Dhara Jones and Catrachito Ramirez RN performed a dual skin assessment on this patient No impairment noted  Aiden score is 20

## 2021-07-22 NOTE — DISCHARGE INSTRUCTIONS
Discharge Instructions       PATIENT ID: Guy Osborn  MRN: [id number]   YOB: 1940    DATE OF ADMISSION: 7/21/2021  4:08 PM    DATE OF DISCHARGE: 7/22/2021    PRIMARY CARE PROVIDER: Unknown, Provider, MD     ATTENDING PHYSICIAN: Karlos Copeland MD  DISCHARGING PROVIDER: Claudetta Standing, MD    To contact this individual call [phone] and ask the  to page. If unavailable ask to be transferred the Adult Hospitalist Department. DISCHARGE DIAGNOSES Atypical CP     CONSULTATIONS: IP CONSULT TO HOSPITALIST  IP CONSULT TO CARDIOLOGY    PROCEDURES/SURGERIES: * No surgery found *    PENDING TEST RESULTS:   At the time of discharge the following test results are still pending:     FOLLOW UP APPOINTMENTS:   Follow-up Information     Follow up With Specialties Details Why Contact Jamar Hernandez MD Cardiology In 1 week follow up with Dr. Jimi Powell in 1-2 weeks. Call to schedule appointment. [address] [place] [address]             ADDITIONAL CARE RECOMMENDATIONS:  Please keep appointment with your cardiologist as scheduled     DIET: Cardiac Diet    ACTIVITY: Activity as tolerated    WOUND CARE:     EQUIPMENT needed:       Radiology      DISCHARGE MEDICATIONS:   See Medication Reconciliation Form    · It is important that you take the medication exactly as they are prescribed. · Keep your medication in the bottles provided by the pharmacist and keep a list of the medication names, dosages, and times to be taken in your wallet. · Do not take other medications without consulting your doctor. NOTIFY YOUR PHYSICIAN FOR ANY OF THE FOLLOWING:   Fever over 101 degrees for 24 hours. Chest pain, shortness of breath, fever, chills, nausea, vomiting, diarrhea, change in mentation, falling, weakness, bleeding. Severe pain or pain not relieved by medications.   Or, any other signs or symptoms that you may have questions about.      DISPOSITION:  x  Home With:   OT  PT  HH  RN       SNF/Inpatient Rehab/LTAC    Independent/assisted living    Hospice    Other:     CDMP Checked:   Yes x     PROBLEM LIST Updated:  Yes x       Signed:   Blas Gotti MD  7/22/2021  11:42 AM

## 2021-07-22 NOTE — PROGRESS NOTES
Bedside shift change report given to Darline Kruger and Martita Juárez (oncoming nurse) by Tavon Norman (offgoing nurse). Report included the following information SBAR, Kardex, Intake/Output, MAR and Recent Results.

## 2021-07-22 NOTE — PROGRESS NOTES
SHERI: [facility] patient. Plan for discharge home with HHPT safety eval.  [facility] has accepted patient. Family to transport home via car. Observation notice provided in writing to patient and/or caregiver as well as verbal explanation of the policy. Patients who are in outpatient status also receive the Observation notice. Patient has received notice and or patient representative has received via secure email, fax, or certified mail based on patient representative's preference. CM spoke with Ruben Gennarojaclyn with [facility] [phone]. This patient is scheduled to see Dr. Xenia Espinal on Aug 3. CM called the PCP office #[phone]  to find out if they can schedule patient to be seen sooner. They can see patient tomorrow, 7/23 at 9:50 AM. CM updated AVS.    [facility] has accepted patient. CM updated AVS.    Patient was discharged before CM could complete full assessment.     Gail Cervantes, BSW/CRM

## 2021-12-27 ENCOUNTER — APPOINTMENT (OUTPATIENT)
Dept: GENERAL RADIOLOGY | Age: 81
End: 2021-12-27
Attending: EMERGENCY MEDICINE
Payer: MEDICARE

## 2021-12-27 ENCOUNTER — HOSPITAL ENCOUNTER (OUTPATIENT)
Age: 81
Setting detail: OBSERVATION
Discharge: HOME | End: 2022-01-01
Attending: EMERGENCY MEDICINE | Admitting: INTERNAL MEDICINE
Payer: MEDICARE

## 2021-12-27 ENCOUNTER — APPOINTMENT (OUTPATIENT)
Dept: ULTRASOUND IMAGING | Age: 81
End: 2021-12-27
Attending: EMERGENCY MEDICINE
Payer: MEDICARE

## 2021-12-27 DIAGNOSIS — I10 PRIMARY HYPERTENSION: ICD-10-CM

## 2021-12-27 DIAGNOSIS — J18.9 PNEUMONIA OF BOTH LOWER LOBES DUE TO INFECTIOUS ORGANISM: ICD-10-CM

## 2021-12-27 DIAGNOSIS — R07.89 ATYPICAL CHEST PAIN: ICD-10-CM

## 2021-12-27 DIAGNOSIS — I70.219 ATHEROSCLEROSIS OF NATIVE ARTERIES OF THE EXTREMITIES WITH INTERMITTENT CLAUDICATION: ICD-10-CM

## 2021-12-27 DIAGNOSIS — E78.5 DYSLIPIDEMIA: Chronic | ICD-10-CM

## 2021-12-27 DIAGNOSIS — I24.9 ACS (ACUTE CORONARY SYNDROME) (HCC): Primary | ICD-10-CM

## 2021-12-27 PROBLEM — R07.9 CHEST PAIN: Status: ACTIVE | Noted: 2021-12-27

## 2021-12-27 LAB
ALBUMIN SERPL-MCNC: 3.9 G/DL (ref 3.5–5)
ALBUMIN/GLOB SERPL: 0.8 {RATIO} (ref 1.1–2.2)
ALP SERPL-CCNC: 87 U/L (ref 45–117)
ALT SERPL-CCNC: 18 U/L (ref 12–78)
ANION GAP SERPL CALC-SCNC: 8 MMOL/L (ref 5–15)
APTT PPP: 121.8 SEC (ref 22.1–31)
AST SERPL-CCNC: 23 U/L (ref 15–37)
BASOPHILS # BLD: 0 K/UL (ref 0–0.1)
BASOPHILS # BLD: 0.1 K/UL (ref 0–0.1)
BASOPHILS NFR BLD: 1 % (ref 0–1)
BASOPHILS NFR BLD: 1 % (ref 0–1)
BILIRUB SERPL-MCNC: 0.9 MG/DL (ref 0.2–1)
BNP SERPL-MCNC: 884 PG/ML
BUN SERPL-MCNC: 8 MG/DL (ref 6–20)
BUN/CREAT SERPL: 9 (ref 12–20)
CALCIUM SERPL-MCNC: 10.2 MG/DL (ref 8.5–10.1)
CHLORIDE SERPL-SCNC: 109 MMOL/L (ref 97–108)
CO2 SERPL-SCNC: 23 MMOL/L (ref 21–32)
COMMENT, HOLDF: NORMAL
COVID-19 RAPID TEST, COVR: NOT DETECTED
CREAT SERPL-MCNC: 0.89 MG/DL (ref 0.55–1.02)
DIFFERENTIAL METHOD BLD: NORMAL
DIFFERENTIAL METHOD BLD: NORMAL
EOSINOPHIL # BLD: 0.1 K/UL (ref 0–0.4)
EOSINOPHIL # BLD: 0.2 K/UL (ref 0–0.4)
EOSINOPHIL NFR BLD: 1 % (ref 0–7)
EOSINOPHIL NFR BLD: 2 % (ref 0–7)
ERYTHROCYTE [DISTWIDTH] IN BLOOD BY AUTOMATED COUNT: 13.1 % (ref 11.5–14.5)
ERYTHROCYTE [DISTWIDTH] IN BLOOD BY AUTOMATED COUNT: 13.3 % (ref 11.5–14.5)
GLOBULIN SER CALC-MCNC: 4.7 G/DL (ref 2–4)
GLUCOSE SERPL-MCNC: 97 MG/DL (ref 65–100)
HCT VFR BLD AUTO: 36.8 % (ref 35–47)
HCT VFR BLD AUTO: 37.2 % (ref 35–47)
HGB BLD-MCNC: 12.5 G/DL (ref 11.5–16)
HGB BLD-MCNC: 12.6 G/DL (ref 11.5–16)
IMM GRANULOCYTES # BLD AUTO: 0 K/UL (ref 0–0.04)
IMM GRANULOCYTES # BLD AUTO: 0 K/UL (ref 0–0.04)
IMM GRANULOCYTES NFR BLD AUTO: 0 % (ref 0–0.5)
IMM GRANULOCYTES NFR BLD AUTO: 0 % (ref 0–0.5)
LACTATE BLD-SCNC: 0.86 MMOL/L (ref 0.4–2)
LIPASE SERPL-CCNC: 95 U/L (ref 73–393)
LYMPHOCYTES # BLD: 2.1 K/UL (ref 0.8–3.5)
LYMPHOCYTES # BLD: 3.2 K/UL (ref 0.8–3.5)
LYMPHOCYTES NFR BLD: 35 % (ref 12–49)
LYMPHOCYTES NFR BLD: 40 % (ref 12–49)
MCH RBC QN AUTO: 30.6 PG (ref 26–34)
MCH RBC QN AUTO: 30.9 PG (ref 26–34)
MCHC RBC AUTO-ENTMCNC: 33.9 G/DL (ref 30–36.5)
MCHC RBC AUTO-ENTMCNC: 34 G/DL (ref 30–36.5)
MCV RBC AUTO: 90.2 FL (ref 80–99)
MCV RBC AUTO: 91.2 FL (ref 80–99)
MONOCYTES # BLD: 0.6 K/UL (ref 0–1)
MONOCYTES # BLD: 0.7 K/UL (ref 0–1)
MONOCYTES NFR BLD: 10 % (ref 5–13)
MONOCYTES NFR BLD: 9 % (ref 5–13)
NEUTS SEG # BLD: 3.3 K/UL (ref 1.8–8)
NEUTS SEG # BLD: 3.7 K/UL (ref 1.8–8)
NEUTS SEG NFR BLD: 48 % (ref 32–75)
NEUTS SEG NFR BLD: 53 % (ref 32–75)
NRBC # BLD: 0 K/UL (ref 0–0.01)
NRBC # BLD: 0 K/UL (ref 0–0.01)
NRBC BLD-RTO: 0 PER 100 WBC
NRBC BLD-RTO: 0 PER 100 WBC
PLATELET # BLD AUTO: 245 K/UL (ref 150–400)
PLATELET # BLD AUTO: 246 K/UL (ref 150–400)
PMV BLD AUTO: 9.1 FL (ref 8.9–12.9)
PMV BLD AUTO: 9.4 FL (ref 8.9–12.9)
POTASSIUM SERPL-SCNC: 3.3 MMOL/L (ref 3.5–5.1)
PROCALCITONIN SERPL-MCNC: <0.05 NG/ML
PROT SERPL-MCNC: 8.6 G/DL (ref 6.4–8.2)
RBC # BLD AUTO: 4.08 M/UL (ref 3.8–5.2)
RBC # BLD AUTO: 4.08 M/UL (ref 3.8–5.2)
SAMPLES BEING HELD,HOLD: NORMAL
SODIUM SERPL-SCNC: 140 MMOL/L (ref 136–145)
SOURCE, COVRS: NORMAL
THERAPEUTIC RANGE,PTTT: ABNORMAL SECS (ref 58–77)
TROPONIN-HIGH SENSITIVITY: 30 NG/L (ref 0–51)
WBC # BLD AUTO: 6.2 K/UL (ref 3.6–11)
WBC # BLD AUTO: 7.8 K/UL (ref 3.6–11)

## 2021-12-27 PROCEDURE — 96365 THER/PROPH/DIAG IV INF INIT: CPT

## 2021-12-27 PROCEDURE — 87040 BLOOD CULTURE FOR BACTERIA: CPT

## 2021-12-27 PROCEDURE — 85730 THROMBOPLASTIN TIME PARTIAL: CPT

## 2021-12-27 PROCEDURE — 74011000250 HC RX REV CODE- 250: Performed by: EMERGENCY MEDICINE

## 2021-12-27 PROCEDURE — 80053 COMPREHEN METABOLIC PANEL: CPT

## 2021-12-27 PROCEDURE — 83690 ASSAY OF LIPASE: CPT

## 2021-12-27 PROCEDURE — 74011250636 HC RX REV CODE- 250/636: Performed by: EMERGENCY MEDICINE

## 2021-12-27 PROCEDURE — 99285 EMERGENCY DEPT VISIT HI MDM: CPT

## 2021-12-27 PROCEDURE — 83605 ASSAY OF LACTIC ACID: CPT

## 2021-12-27 PROCEDURE — 76705 ECHO EXAM OF ABDOMEN: CPT

## 2021-12-27 PROCEDURE — 74011250637 HC RX REV CODE- 250/637: Performed by: INTERNAL MEDICINE

## 2021-12-27 PROCEDURE — 96366 THER/PROPH/DIAG IV INF ADDON: CPT

## 2021-12-27 PROCEDURE — 74011000258 HC RX REV CODE- 258: Performed by: EMERGENCY MEDICINE

## 2021-12-27 PROCEDURE — 74011250637 HC RX REV CODE- 250/637: Performed by: NURSE PRACTITIONER

## 2021-12-27 PROCEDURE — 83880 ASSAY OF NATRIURETIC PEPTIDE: CPT

## 2021-12-27 PROCEDURE — 84145 PROCALCITONIN (PCT): CPT

## 2021-12-27 PROCEDURE — APPSS60 APP SPLIT SHARED TIME 46-60 MINUTES: Performed by: NURSE PRACTITIONER

## 2021-12-27 PROCEDURE — 93005 ELECTROCARDIOGRAM TRACING: CPT

## 2021-12-27 PROCEDURE — 36415 COLL VENOUS BLD VENIPUNCTURE: CPT

## 2021-12-27 PROCEDURE — 87635 SARS-COV-2 COVID-19 AMP PRB: CPT

## 2021-12-27 PROCEDURE — 96375 TX/PRO/DX INJ NEW DRUG ADDON: CPT

## 2021-12-27 PROCEDURE — 84484 ASSAY OF TROPONIN QUANT: CPT

## 2021-12-27 PROCEDURE — 71045 X-RAY EXAM CHEST 1 VIEW: CPT

## 2021-12-27 PROCEDURE — G0378 HOSPITAL OBSERVATION PER HR: HCPCS

## 2021-12-27 PROCEDURE — 74011250637 HC RX REV CODE- 250/637: Performed by: EMERGENCY MEDICINE

## 2021-12-27 PROCEDURE — 85025 COMPLETE CBC W/AUTO DIFF WBC: CPT

## 2021-12-27 PROCEDURE — 99214 OFFICE O/P EST MOD 30 MIN: CPT | Performed by: INTERNAL MEDICINE

## 2021-12-27 RX ORDER — CILOSTAZOL 100 MG/1
50 TABLET ORAL DAILY
Status: DISCONTINUED | OUTPATIENT
Start: 2021-12-28 | End: 2022-01-01 | Stop reason: HOSPADM

## 2021-12-27 RX ORDER — ACETAMINOPHEN 325 MG/1
650 TABLET ORAL
Status: DISCONTINUED | OUTPATIENT
Start: 2021-12-27 | End: 2022-01-01 | Stop reason: HOSPADM

## 2021-12-27 RX ORDER — ASPIRIN 81 MG/1
81 TABLET ORAL DAILY
Status: DISCONTINUED | OUTPATIENT
Start: 2021-12-28 | End: 2021-12-27

## 2021-12-27 RX ORDER — SODIUM CHLORIDE 0.9 % (FLUSH) 0.9 %
5-40 SYRINGE (ML) INJECTION AS NEEDED
Status: DISCONTINUED | OUTPATIENT
Start: 2021-12-27 | End: 2022-01-01 | Stop reason: HOSPADM

## 2021-12-27 RX ORDER — PANTOPRAZOLE SODIUM 40 MG/1
40 TABLET, DELAYED RELEASE ORAL
Status: DISCONTINUED | OUTPATIENT
Start: 2021-12-28 | End: 2021-12-30

## 2021-12-27 RX ORDER — NITROGLYCERIN 0.4 MG/1
0.4 TABLET SUBLINGUAL
Status: DISCONTINUED | OUTPATIENT
Start: 2021-12-27 | End: 2022-01-01 | Stop reason: HOSPADM

## 2021-12-27 RX ORDER — ALPRAZOLAM 0.5 MG/1
1 TABLET ORAL EVERY EVENING
Status: DISCONTINUED | OUTPATIENT
Start: 2021-12-27 | End: 2022-01-01 | Stop reason: HOSPADM

## 2021-12-27 RX ORDER — HEPARIN SODIUM 10000 [USP'U]/100ML
9-25 INJECTION, SOLUTION INTRAVENOUS
Status: DISCONTINUED | OUTPATIENT
Start: 2021-12-27 | End: 2021-12-28

## 2021-12-27 RX ORDER — LORAZEPAM 2 MG/ML
0.5 INJECTION INTRAMUSCULAR
Status: COMPLETED | OUTPATIENT
Start: 2021-12-27 | End: 2021-12-27

## 2021-12-27 RX ORDER — TRAZODONE HYDROCHLORIDE 50 MG/1
50 TABLET ORAL
Status: DISCONTINUED | OUTPATIENT
Start: 2021-12-27 | End: 2022-01-01 | Stop reason: HOSPADM

## 2021-12-27 RX ORDER — HEPARIN SODIUM 1000 [USP'U]/ML
30 INJECTION, SOLUTION INTRAVENOUS; SUBCUTANEOUS AS NEEDED
Status: DISCONTINUED | OUTPATIENT
Start: 2021-12-27 | End: 2021-12-28

## 2021-12-27 RX ORDER — MONTELUKAST SODIUM 10 MG/1
10 TABLET ORAL DAILY
Status: DISCONTINUED | OUTPATIENT
Start: 2021-12-28 | End: 2022-01-01 | Stop reason: HOSPADM

## 2021-12-27 RX ORDER — METOPROLOL TARTRATE 5 MG/5ML
2.5 INJECTION INTRAVENOUS
Status: COMPLETED | OUTPATIENT
Start: 2021-12-27 | End: 2021-12-27

## 2021-12-27 RX ORDER — METOPROLOL TARTRATE 25 MG/1
25 TABLET, FILM COATED ORAL EVERY 12 HOURS
Status: DISCONTINUED | OUTPATIENT
Start: 2021-12-27 | End: 2022-01-01 | Stop reason: HOSPADM

## 2021-12-27 RX ORDER — FENTANYL CITRATE 50 UG/ML
50 INJECTION, SOLUTION INTRAMUSCULAR; INTRAVENOUS
Status: COMPLETED | OUTPATIENT
Start: 2021-12-27 | End: 2021-12-27

## 2021-12-27 RX ORDER — GUAIFENESIN 100 MG/5ML
81 LIQUID (ML) ORAL DAILY
Status: DISCONTINUED | OUTPATIENT
Start: 2021-12-28 | End: 2022-01-01 | Stop reason: HOSPADM

## 2021-12-27 RX ORDER — HEPARIN SODIUM 1000 [USP'U]/ML
60 INJECTION, SOLUTION INTRAVENOUS; SUBCUTANEOUS AS NEEDED
Status: DISCONTINUED | OUTPATIENT
Start: 2021-12-27 | End: 2021-12-28

## 2021-12-27 RX ORDER — SODIUM CHLORIDE 0.9 % (FLUSH) 0.9 %
5-40 SYRINGE (ML) INJECTION EVERY 8 HOURS
Status: DISCONTINUED | OUTPATIENT
Start: 2021-12-27 | End: 2022-01-01 | Stop reason: HOSPADM

## 2021-12-27 RX ORDER — ATORVASTATIN CALCIUM 40 MG/1
40 TABLET, FILM COATED ORAL
Status: DISCONTINUED | OUTPATIENT
Start: 2021-12-27 | End: 2022-01-01 | Stop reason: HOSPADM

## 2021-12-27 RX ORDER — HEPARIN SODIUM 1000 [USP'U]/ML
60 INJECTION, SOLUTION INTRAVENOUS; SUBCUTANEOUS ONCE
Status: COMPLETED | OUTPATIENT
Start: 2021-12-27 | End: 2021-12-27

## 2021-12-27 RX ORDER — CITALOPRAM 20 MG/1
40 TABLET, FILM COATED ORAL DAILY
Status: DISCONTINUED | OUTPATIENT
Start: 2021-12-29 | End: 2022-01-01 | Stop reason: HOSPADM

## 2021-12-27 RX ADMIN — METOPROLOL TARTRATE 2.5 MG: 5 INJECTION INTRAVENOUS at 15:31

## 2021-12-27 RX ADMIN — NITROGLYCERIN 1 INCH: 20 OINTMENT TOPICAL at 15:21

## 2021-12-27 RX ADMIN — ATORVASTATIN CALCIUM 40 MG: 40 TABLET, FILM COATED ORAL at 23:53

## 2021-12-27 RX ADMIN — TRAZODONE HYDROCHLORIDE 50 MG: 50 TABLET ORAL at 23:54

## 2021-12-27 RX ADMIN — ALPRAZOLAM 1 MG: 0.5 TABLET ORAL at 21:18

## 2021-12-27 RX ADMIN — LORAZEPAM 0.5 MG: 2 INJECTION INTRAMUSCULAR; INTRAVENOUS at 15:21

## 2021-12-27 RX ADMIN — HEPARIN SODIUM AND DEXTROSE 12.5 UNITS/KG/HR: 10000; 5 INJECTION INTRAVENOUS at 16:52

## 2021-12-27 RX ADMIN — HEPARIN SODIUM 3000 UNITS: 1000 INJECTION INTRAVENOUS; SUBCUTANEOUS at 16:43

## 2021-12-27 RX ADMIN — METOPROLOL TARTRATE 25 MG: 25 TABLET, FILM COATED ORAL at 21:15

## 2021-12-27 RX ADMIN — FENTANYL CITRATE 50 MCG: 50 INJECTION, SOLUTION INTRAMUSCULAR; INTRAVENOUS at 15:38

## 2021-12-27 NOTE — ED PROVIDER NOTES
EMERGENCY DEPARTMENT HISTORY AND PHYSICAL EXAM      Date: 12/27/2021  Patient Name: Giorgi Choi    History of Presenting Illness     CC: Chest pain    History Provided By: Patient and EMS    HPI: Giorgi Choi, 80 y.o. female presents to the ED with cc of pain. Patient was on a flight coming back home when she began to experience pain in the left side of her chest as well as epigastric region. Patient does have a cardiac history in addition to have a prior history of lung cancer and COPD. Per EMS family noted that she had been having some intermittent chest discomfort over the last couple days. In route to the hospital patient was complaining of chest pain that had somewhat improved however it is present upon arrival to the emergency department. She states the pain is described as a dull ache rated at a 7 out of 10. She denies any alleviating factors. She states that she does feel short of breath. There has been no diaphoresis. She denies any nausea, vomiting or diarrhea. There is been no recent melena, hematochezia or bright red blood per rectum. She denies any recent sick contacts. She denies any recent fever chills, cough or cold symptoms. There are no other complaints, changes, or physical findings at this time. PCP: Unknown, Provider, MD    No current facility-administered medications on file prior to encounter. Current Outpatient Medications on File Prior to Encounter   Medication Sig Dispense Refill    omeprazole (PRILOSEC) 20 mg capsule Take 20 mg by mouth daily.  naproxen (NAPROSYN) 375 mg tablet Take 375 mg by mouth as needed.  cilostazoL (PLETAL) 50 mg tablet TAKE 1 TABLET BY MOUTH ONCE DAILY      citalopram hydrobromide (CITALOPRAM PO) Take  by mouth daily.  Indications: Patient unsure of the dosage      clopidogrel (PLAVIX) 75 mg tab TAKE 1 TABLET BY MOUTH DAILY (Patient not taking: Reported on 7/21/2021) 30 Tab 3    HYDROcodone-acetaminophen ([address]) 7.5-325 mg per tablet Take  by mouth two (2) times daily as needed for Pain. (Patient not taking: Reported on 7/21/2021)      ondansetron hcl (ZOFRAN, AS HYDROCHLORIDE,) 4 mg tablet Take 4 mg by mouth every eight (8) hours as needed for Nausea.  clopidogrel (PLAVIX) 75 mg tab TAKE 1 TABLET BY MOUTH DAILY (Patient not taking: Reported on 7/21/2021) 90 Tab 11    montelukast (SINGULAIR) 10 mg tablet Take 10 mg by mouth daily.  pantoprazole (PROTONIX) 20 mg tablet Take 20 mg by mouth daily. (Patient not taking: Reported on 7/21/2021)      cholecalciferol, vitamin D3, 2,000 unit tab Take  by mouth every other day. (Patient not taking: Reported on 7/21/2021)      polyethylene glycol (MIRALAX) 17 gram/dose powder Take 17 g by mouth every other day. (Patient not taking: Reported on 7/21/2021)      aspirin delayed-release 81 mg tablet Take  by mouth daily.  QUEtiapine (SEROQUEL) 100 mg tablet Take 50 mg by mouth nightly. (Patient not taking: Reported on 7/21/2021)      escitalopram oxalate (LEXAPRO) 10 mg tablet Take 10 mg by mouth daily. (Patient not taking: Reported on 7/21/2021)      simvastatin (ZOCOR) 40 mg tablet Take 40 mg by mouth nightly.  promethazine (PHENERGAN) 25 mg tablet Take 1 tablet by mouth every six (6) hours as needed for Nausea. (Patient not taking: Reported on 7/21/2021) 15 tablet 0    ALPRAZolam (XANAX) 1 mg tablet Take 1 mg by mouth every evening.  traZODone (DESYREL) 100 mg tablet Take 50 mg by mouth nightly.  tiotropium (SPIRIVA WITH HANDIHALER) 18 mcg inhalation capsule Take 1 Cap by inhalation daily.            Past History     Past Medical History:  Past Medical History:   Diagnosis Date    Anxiety     CAD (coronary artery disease) 10/11/2012    [facility] [address]     lung    [facility]     [facility]    Chronic obstructive pulmonary disease (HCC)     Chronic pain     legs and lower back    COPD     Depression     Dyslipidemia 10/11/2012    Endocrine disease     Gastrointestinal disorder     gastric ulcers    GERD (gastroesophageal reflux disease)     Headache(784.0)     Hematuria     Hypertension     Lung cancer ([facility] [address] [facility]     Malignant neoplasm of bronchus ([facility]     Meningitis 2010    Psychiatric disorder     depression    Psychiatric disorder     depression, anxiety    PUD (peptic ulcer disease)        Past Surgical History:  Past Surgical History:   Procedure Laterality Date    DELIVERY       HX BREAST BIOPSY Left     neg path    HX GI      reflux surgery    HX GYN       3 c-sections    HX HYSTERECTOMY      HX OTHER SURGICAL      lobectomy    HX UROLOGICAL      stone rmvl with lithotripsy    DE CARDIAC SURG PROCEDURE UNLIST      SHARON placed 10- by Dr. Venus Guadalupe      cardiac stents    Carol Ann Reyes      left upper lobe ectomy secondary to carcinoma    DE REMOVE LUNG/CHEST WALL      VASCULAR SURGERY PROCEDURE UNLIST      fem-fem bypass    VASCULAR SURGERY PROCEDURE UNLIST      leg stents       Family History:  Family History   Problem Relation Age of Onset    Cancer Mother         colon    Cancer Father         prostate    Cancer Sister         breast    Breast Cancer Sister     Cancer Brother         non-hodgkin's lymphoma       Social History:  Social History     Tobacco Use    Smoking status: Former Smoker    Smokeless tobacco: Not on file   Substance Use Topics    Alcohol use: Yes     Comment: occasional glass of wine    Drug use: No       Allergies:  No Known Allergies      Review of Systems   Review of Systems   Constitutional: Negative. Negative for appetite change, chills, fatigue and fever. HENT: Negative. Negative for congestion, rhinorrhea, sinus pressure and sore throat. Eyes: Negative. Respiratory: Positive for shortness of breath. Negative for cough, choking, chest tightness and wheezing. Cardiovascular: Positive for chest pain. Negative for palpitations and leg swelling. Gastrointestinal: Positive for abdominal pain (epigastric ). Negative for constipation, diarrhea, nausea and vomiting. Endocrine: Negative. Genitourinary: Negative. Negative for difficulty urinating, dysuria, flank pain and urgency. Musculoskeletal: Negative. Skin: Negative. Neurological: Negative. Negative for dizziness, speech difficulty, weakness, light-headedness, numbness and headaches. Psychiatric/Behavioral: Negative. All other systems reviewed and are negative. Physical Exam   Physical Exam  Vitals and nursing note reviewed. Constitutional:       General: She is not in acute distress. Appearance: She is well-developed. She is not diaphoretic. HENT:      Head: Normocephalic and atraumatic. Mouth/Throat:      Pharynx: No oropharyngeal exudate. Eyes:      Extraocular Movements: Extraocular movements intact. Conjunctiva/sclera: Conjunctivae normal.      Pupils: Pupils are equal, round, and reactive to light. Neck:      Vascular: No JVD. Trachea: No tracheal deviation. Cardiovascular:      Rate and Rhythm: Normal rate and regular rhythm. Heart sounds: Normal heart sounds. No murmur heard. Pulmonary:      Effort: Pulmonary effort is normal. No respiratory distress. Breath sounds: Normal breath sounds. No stridor. No wheezing or rales. Chest:      Chest wall: No tenderness. Abdominal:      General: There is no distension. Palpations: Abdomen is soft. Tenderness: There is no abdominal tenderness. There is no guarding or rebound. Musculoskeletal:         General: No tenderness. Normal range of motion. Cervical back: Normal range of motion and neck supple. Right lower leg: No edema. Left lower leg: No edema. Skin:     General: Skin is warm and dry. Neurological:      Mental Status: She is alert and oriented to person, place, and time.       Cranial Nerves: No cranial nerve deficit. Comments: No gross motor or sensory deficits    Psychiatric:         Mood and Affect: Mood is anxious. Behavior: Behavior normal.         Diagnostic Study Results     Labs -     Recent Results (from the past 12 hour(s))   COVID-19 RAPID TEST    Collection Time: 12/27/21  3:14 PM   Result Value Ref Range    Specimen source Nasopharyngeal      COVID-19 rapid test Not detected NOTD     SAMPLES BEING HELD    Collection Time: 12/27/21  3:17 PM   Result Value Ref Range    SAMPLES BEING HELD BLUE     COMMENT        Add-on orders for these samples will be processed based on acceptable specimen integrity and analyte stability, which may vary by analyte. LIPASE    Collection Time: 12/27/21  3:17 PM   Result Value Ref Range    Lipase 95 73 - 138 U/L   METABOLIC PANEL, COMPREHENSIVE    Collection Time: 12/27/21  3:17 PM   Result Value Ref Range    Sodium 140 136 - 145 mmol/L    Potassium 3.3 (L) 3.5 - 5.1 mmol/L    Chloride 109 (H) 97 - 108 mmol/L    CO2 23 21 - 32 mmol/L    Anion gap 8 5 - 15 mmol/L    Glucose 97 65 - 100 mg/dL    BUN 8 6 - 20 MG/DL    Creatinine 0.89 0.55 - 1.02 MG/DL    BUN/Creatinine ratio 9 (L) 12 - 20      GFR est AA >60 >60 ml/min/1.73m2    GFR est non-AA >60 >60 ml/min/1.73m2    Calcium 10.2 (H) 8.5 - 10.1 MG/DL    Bilirubin, total 0.9 0.2 - 1.0 MG/DL    ALT (SGPT) 18 12 - 78 U/L    AST (SGOT) 23 15 - 37 U/L    Alk.  phosphatase 87 45 - 117 U/L    Protein, total 8.6 (H) 6.4 - 8.2 g/dL    Albumin 3.9 3.5 - 5.0 g/dL    Globulin 4.7 (H) 2.0 - 4.0 g/dL    A-G Ratio 0.8 (L) 1.1 - 2.2     TROPONIN-HIGH SENSITIVITY    Collection Time: 12/27/21  3:17 PM   Result Value Ref Range    Troponin-High Sensitivity 30 0 - 51 ng/L   CBC WITH AUTOMATED DIFF    Collection Time: 12/27/21  4:39 PM   Result Value Ref Range    WBC 6.2 3.6 - 11.0 K/uL    RBC 4.08 3.80 - 5.20 M/uL    HGB 12.6 11.5 - 16.0 g/dL    HCT 37.2 35.0 - 47.0 %    MCV 91.2 80.0 - 99.0 FL    MCH 30.9 26.0 - 34.0 PG    MCHC 33.9 30.0 - 36.5 g/dL    RDW 13.1 11.5 - 14.5 %    PLATELET 503 000 - 414 K/uL    MPV 9.1 8.9 - 12.9 FL    NRBC 0.0 0  WBC    ABSOLUTE NRBC 0.00 0.00 - 0.01 K/uL    NEUTROPHILS 53 32 - 75 %    LYMPHOCYTES 35 12 - 49 %    MONOCYTES 10 5 - 13 %    EOSINOPHILS 1 0 - 7 %    BASOPHILS 1 0 - 1 %    IMMATURE GRANULOCYTES 0 0.0 - 0.5 %    ABS. NEUTROPHILS 3.3 1.8 - 8.0 K/UL    ABS. LYMPHOCYTES 2.1 0.8 - 3.5 K/UL    ABS. MONOCYTES 0.6 0.0 - 1.0 K/UL    ABS. EOSINOPHILS 0.1 0.0 - 0.4 K/UL    ABS. BASOPHILS 0.0 0.0 - 0.1 K/UL    ABS. IMM. GRANS. 0.0 0.00 - 0.04 K/UL    DF AUTOMATED     POC LACTIC ACID    Collection Time: 12/27/21  7:30 PM   Result Value Ref Range    Lactic Acid (POC) 0.86 0.40 - 2.00 mmol/L   NT-PRO BNP    Collection Time: 12/27/21  8:22 PM   Result Value Ref Range    NT pro- (H) <450 PG/ML   PROCALCITONIN    Collection Time: 12/27/21  8:22 PM   Result Value Ref Range    Procalcitonin <0.05 ng/mL   CBC WITH AUTOMATED DIFF    Collection Time: 12/27/21  8:25 PM   Result Value Ref Range    WBC 7.8 3.6 - 11.0 K/uL    RBC 4.08 3.80 - 5.20 M/uL    HGB 12.5 11.5 - 16.0 g/dL    HCT 36.8 35.0 - 47.0 %    MCV 90.2 80.0 - 99.0 FL    MCH 30.6 26.0 - 34.0 PG    MCHC 34.0 30.0 - 36.5 g/dL    RDW 13.3 11.5 - 14.5 %    PLATELET 413 651 - 568 K/uL    MPV 9.4 8.9 - 12.9 FL    NRBC 0.0 0  WBC    ABSOLUTE NRBC 0.00 0.00 - 0.01 K/uL    NEUTROPHILS 48 32 - 75 %    LYMPHOCYTES 40 12 - 49 %    MONOCYTES 9 5 - 13 %    EOSINOPHILS 2 0 - 7 %    BASOPHILS 1 0 - 1 %    IMMATURE GRANULOCYTES 0 0.0 - 0.5 %    ABS. NEUTROPHILS 3.7 1.8 - 8.0 K/UL    ABS. LYMPHOCYTES 3.2 0.8 - 3.5 K/UL    ABS. MONOCYTES 0.7 0.0 - 1.0 K/UL    ABS. EOSINOPHILS 0.2 0.0 - 0.4 K/UL    ABS. BASOPHILS 0.1 0.0 - 0.1 K/UL    ABS. IMM.  GRANS. 0.0 0.00 - 0.04 K/UL    DF AUTOMATED     PTT    Collection Time: 12/27/21  9:13 PM   Result Value Ref Range    aPTT 121.8 (HH) 22.1 - 31.0 sec    aPTT, therapeutic range     58.0 - 77.0 SECS   PTT    Collection Time: 12/27/21 11:42 PM   Result Value Ref Range    aPTT 38.5 (H) 22.1 - 31.0 sec    aPTT, therapeutic range     58.0 - 77.0 SECS   TROPONIN-HIGH SENSITIVITY    Collection Time: 12/27/21 11:42 PM   Result Value Ref Range    Troponin-High Sensitivity 18 0 - 51 ng/L       Radiologic Studies -   US ABD LTD   Final Result   Normal right upper quadrant ultrasound. XR CHEST PORT   Final Result   Hazy opacification of the left mid and lower lung, consistent with   pneumonia. CT Results  (Last 48 hours)    None        CXR Results  (Last 48 hours)    None          Medical Decision Making   I am the first provider for this patient. I reviewed the vital signs, available nursing notes, past medical history, past surgical history, family history and social history. Vital Signs-Reviewed the patient's vital signs. EKG interpretation: (Preliminary)  Sinus, rate 83, ST elevation not present but there is diffuse  ST changes inf/ant/lateral,     Records Reviewed: Nursing Notes, Old Medical Records, Previous electrocardiograms, Ambulance Run Sheet, Previous Radiology Studies and Previous Laboratory Studies, Admission at [facility] 7/2021 for abnormal EKG ,, no intervention at that time      Provider Notes (Medical Decision Making):   DDx- ACS, NSTEMI, electrolyte abnormality, GERD       ED Course:   Initial assessment performed. The patients presenting problems have been discussed, and they are in agreement with the care plan formulated and outlined with them. I have encouraged them to ask questions as they arise throughout their visit. Upon arrival to the emergency department EKG obtained by and regular fire reviewed there is questionable millimeter of ST elevation in aVR and V1 with T wave inversion questionable ST depression in inferior leads.   Patient does have a prior EKG noted in July 2021 for left total which did not show any T wave inversion or ST depression in the inferior leads she has had T wave inversion lateral leads on prior EKGs. Pt given ASA by EMS, no NTG. Pt states pain in the epigastric region and left side if her chest rated 7/10, very anxious. EKG here NSR rate 83, ST wave changes inf, ant lateral, will call Cardiology to review. Consult note:  Case discussed with Dr. Romayne Kugel. No ST elevation however EKG is concerning for ischemia recommends nitroglycerin for pain control in addition to IV metoprolol to bring blood pressure down. Patient will be seen in consult by his group. Ultrasound negative for gallbladder wall thickening and gallstones common bile duct within normal limits. Patient's pain control with nitroglycerin blood pressure improved with IV metoprolol patient will be started on heparin drip secondary to concerns for ischemic EKG changes    Consult note:  Case discussed with hospitalist will see and evaluate patient and admit. Critical Care Time:   CRITICAL CARE NOTE :    2:58 PM    IMPENDING DETERIORATION -Cardiovascular  ASSOCIATED RISK FACTORS - Dysrhythmia  MANAGEMENT- Bedside Assessment and Supervision of Care  INTERPRETATION -  Xrays, ECG, Blood Pressure, Cardiac Output Measures , Screening Ultrasound and labs  INTERVENTIONS - hemodynamic mngmt- NTG,  1\" NTG paste, Lopressor, Heparin, IV Ab  CASE REVIEW - Hospitalist/Intensivist, Medical Sub-Specialist, Nursing and Family  TREATMENT RESPONSE -Stable  PERFORMED BY - Self    NOTES   :  I have spent 45 minutes of critical care time involved in lab review, consultations with specialist, family decision- making, bedside attention and documentation. This time excludes time spent in any separate billed procedures. During this entire length of time I was immediately available to the patient . Rema Kelly DO      Disposition:  Admit      Diagnosis     Clinical Impression:   1. ACS (acute coronary syndrome) ([facility] [address]    2.  Pneumonia of both lower lobes due to infectious organism        Attestations:    Rema Kelly DO    Please note that this dictation was completed with [employer], the computer voice recognition software. Quite often unanticipated grammatical, syntax, homophones, and other interpretive errors are inadvertently transcribed by the computer software. Please disregard these errors. Please excuse any errors that have escaped final proofreading. Thank you.

## 2021-12-27 NOTE — CONSULTS
[facility]     Date of  Admission: 12/27/2021  2:57 PM     Admission type:Emergency    Referral for: chest pain   Referral by: ED     Subjective:     Jimmie Camara is a 80 y.o. female with PMH CAD, HLD, COPD, lung cancer s/p lung resection, PVD, orthostatic hypotension who presented to the ED with chest pain. Per ED provider note Jimmie Camara presented to the ED with c/o chest pains and epigastric pain on a flight today. Has been having intermittent chest discomfort over the past few days. Referred to Cardiology for  Chest pain and ekg changes. EKG with inferolateral ST/T wave changes more significant than 07/21. On assessment, Mary J Hatchett endorses that she's been having intermittent chest pain on the left side of her chest/lateral chest wall. She states the pain lasts \"for a while\" when it is present. Nothing specific makes it better or worse. There has been some associated SOB and finger trembling. Chest wall tender to palpation. Ms. Mike Wolf was unable to describe how the pain feels. She is currently pain free. She has also been having some nausea and abd pain. Her flight was 1.5 hours. Daughter present at bedside. ED MD states the patient's son mentioned that the patient has been having exertional chest pain the past few days and he urged her to be seen. Clarissa J Hatchett  follows with Dr. Star Meyer in the past for cardiology. Last ECHO 01/16 with EF 50-55%. Cath 10/12 with stenting.                Patient Active Problem List    Diagnosis Date Noted    Atypical chest pain 12/27/2021    Primary hypertension 12/27/2021    Atherosclerosis of native arteries of the extremities with intermittent claudication 11/09/2012    Claudication, intermittent ([address] 10/23/2012    Femoral bruit 10/23/2012    Pain 10/23/2012    COPD (chronic obstructive pulmonary disease) ([address] 10/23/2012    Lung cancer ([facility] 10/23/2012    CAD (coronary artery disease) 10/11/2012    Dyslipidemia 10/11/2012      Unknown, Provider, DPM  Past Medical History:   Diagnosis Date    Anxiety     CAD (coronary artery disease) 10/11/2012    Cancer ([facility]     lung    Cancer ([facility]     lung CA    Chronic obstructive pulmonary disease (HCC)     Chronic pain     legs and lower back    COPD     Depression     Dyslipidemia 10/11/2012    Endocrine disease     Gastrointestinal disorder     gastric ulcers    GERD (gastroesophageal reflux disease)     Headache(784.0)     Hematuria     Hypertension     Lung cancer ([facility]     Mal[facility] ([facility]     Meningitis 2010    Psychiatric disorder     depression    Psychiatric disorder     depression, anxiety    PUD (peptic ulcer disease)       Social History     Socioeconomic History    Marital status:    Tobacco Use    Smoking status: Former Smoker   Substance and Sexual Activity    Alcohol use: Yes     Comment: occasional glass of wine    Drug use: No    Sexual activity: Not Currently   Social History Narrative    ** Merged History Encounter **          No Known Allergies   Family History   Problem Relation Age of Onset    Cancer Mother         colon    Cancer Father         prostate    Cancer Sister         breast    Breast Cancer Sister     Cancer Brother         non-hodgkin's lymphoma      Current Facility-Administered Medications   Medication Dose Route Frequency    sodium chloride (NS) flush 5-40 mL  5-40 mL IntraVENous Q8H    sodium chloride (NS) flush 5-40 mL  5-40 mL IntraVENous PRN    nitroglycerin (NITROSTAT) tablet 0.4 mg  0.4 mg SubLINGual Q5MIN PRN    heparin 25,000 units in D5W 250 ml infusion  12-25 Units/kg/hr IntraVENous TITRATE    heparin (porcine) 1,000 unit/mL injection 1,500 Units  30 Units/kg IntraVENous PRN    Or    heparin (porcine) 1,000 unit/mL injection 3,000 Units  60 Units/kg IntraVENous PRN     Current Outpatient Medications Medication Sig    omeprazole (PRILOSEC) 20 mg capsule Take 20 mg by mouth daily.  naproxen (NAPROSYN) 375 mg tablet Take 375 mg by mouth as needed.  cilostazoL (PLETAL) 50 mg tablet TAKE 1 TABLET BY MOUTH ONCE DAILY    citalopram hydrobromide (CITALOPRAM PO) Take  by mouth daily. Indications: Patient unsure of the dosage    clopidogrel (PLAVIX) 75 mg tab TAKE 1 TABLET BY MOUTH DAILY (Patient not taking: Reported on 7/21/2021)    HYDROcodone-acetaminophen (NORCO) 7.5-325 mg per tablet Take  by mouth two (2) times daily as needed for Pain. (Patient not taking: Reported on 7/21/2021)    ondansetron hcl (ZOFRAN, AS HYDROCHLORIDE,) 4 mg tablet Take 4 mg by mouth every eight (8) hours as needed for Nausea.  clopidogrel (PLAVIX) 75 mg tab TAKE 1 TABLET BY MOUTH DAILY (Patient not taking: Reported on 7/21/2021)    montelukast (SINGULAIR) 10 mg tablet Take 10 mg by mouth daily.  pantoprazole (PROTONIX) 20 mg tablet Take 20 mg by mouth daily. (Patient not taking: Reported on 7/21/2021)    cholecalciferol, vitamin D3, 2,000 unit tab Take  by mouth every other day. (Patient not taking: Reported on 7/21/2021)    polyethylene glycol (MIRALAX) 17 gram/dose powder Take 17 g by mouth every other day. (Patient not taking: Reported on 7/21/2021)    aspirin delayed-release 81 mg tablet Take  by mouth daily.  QUEtiapine (SEROQUEL) 100 mg tablet Take 50 mg by mouth nightly. (Patient not taking: Reported on 7/21/2021)    escitalopram oxalate (LEXAPRO) 10 mg tablet Take 10 mg by mouth daily. (Patient not taking: Reported on 7/21/2021)    simvastatin (ZOCOR) 40 mg tablet Take 40 mg by mouth nightly.  promethazine (PHENERGAN) 25 mg tablet Take 1 tablet by mouth every six (6) hours as needed for Nausea. (Patient not taking: Reported on 7/21/2021)    ALPRAZolam (XANAX) 1 mg tablet Take 1 mg by mouth every evening.  traZODone (DESYREL) 100 mg tablet Take 50 mg by mouth nightly.     tiotropium (SPIRIVA WITH HANDIHALER) 18 mcg inhalation capsule Take 1 Cap by inhalation daily. Review of Symptoms:  Constitutional: negative  Eyes: negative  Ears, nose, mouth, throat, and face: negative  Respiratory: SOB  Cardiovascular: chest pain  Gastrointestinal: abd pain, nausea   Genitourinary:negative  Musculoskeletal:chest wall pain  Neurological: negative  Behvioral/Psych: negative  Endocrine: negative            Objective:      Visit Vitals  BP (!) 187/84   Pulse 80   Temp 98.3 °F (36.8 °C)   Resp 19   Ht 4' 11\" (1.499 m)   Wt 50 kg (110 lb 3.7 oz)   SpO2 100%   BMI 22.26 kg/m²       Physical Assessment:   General Appearance:  drowsy, cooperative, thin, elderly AAF resting on stretcher in NAD; appears stated age  Eyes: sclera anicteric  Mouth/Throat: moist mucous membranes; oral pharynx clear  Neck: supple;   Pulmonary:  clear to auscultation bilaterally; good effort  Cardiovascular: regular rate and rhythm; no murmur, click, rub, or gallop  Abdomen: soft, non-distended; bowel sounds normal  Musculoskeletal: no swelling or deformity; moves all extremities  Extremities: no edema; palpable distal pulses   Skin: warm and dry  Neuro: slightly drowsy. Occasionally slow to respond  Psych: flat/midly sedated       Data Review:   No results for input(s): WBC, HGB, HCT, PLT, HGBEXT, HCTEXT, PLTEXT, HGBEXT, HCTEXT, PLTEXT in the last 72 hours. Recent Labs     12/27/21  1517      K 3.3*   *   CO2 23   GLU 97   BUN 8   CREA 0.89   CA 10.2*   ALB 3.9   TBILI 0.9   ALT 18       No results for input(s): TROPHS, CPK, CKMB in the last 72 hours. No intake or output data in the 24 hours ending 12/27/21 1652     Cardiographics    Telemetry: SR to SB  ECG: SR with ST and T wave changes inferolateral slightly worse than 07/21   Echocardiogram: last as above; repeat ordered   CXRAY: \"Hazy opacification of the left mid and lower lung, consistent with  Pneumonia. \"       Assessment:       Active Problems:    Atypical chest pain (12/27/2021)      Primary hypertension (12/27/2021)         Plan:     Yudelka Sandoval is a 80 y.o. female who arrived via EMS with chest pain that started on a flight today. EKG with ST and T wave changes slightly worse than 07/21. Chest pain atypical.  Uncontrolled HTN/hypertensive urgency. Labs pending. CXR read as possible PNA  · Ischemic changes on ekg. Unstable angina vs nstemi vs hypertensive urgency. · Troponin pending  · Obtain ECHO  · Heparin drip and nitrobid initiated in the ED. ASA by EMS  · Given a dose of IV BB with mild improvement in BP. Will give additional BP to slow bring down BP. · If cardiac cath indicated from above testing, would plan for cardiac cath tomorrow with Dr. Kateryna Kendrick.     · abd pain per hospitalist           Thank you for referring this patient to [id number], CARLOS MORGAN, RN, AGACNP-BC

## 2021-12-27 NOTE — ED TRIAGE NOTES
Pt c/o pain in her chest and in her stomach. She states that she has had dark stool and she is nauseated at this time.

## 2021-12-28 ENCOUNTER — APPOINTMENT (OUTPATIENT)
Dept: NUCLEAR MEDICINE | Age: 81
End: 2021-12-28
Attending: NURSE PRACTITIONER
Payer: MEDICARE

## 2021-12-28 ENCOUNTER — HOSPITAL ENCOUNTER (OUTPATIENT)
Dept: NON INVASIVE DIAGNOSTICS | Age: 81
Discharge: HOME | End: 2021-12-28
Attending: NURSE PRACTITIONER
Payer: MEDICARE

## 2021-12-28 ENCOUNTER — APPOINTMENT (OUTPATIENT)
Dept: NON INVASIVE DIAGNOSTICS | Age: 81
End: 2021-12-28
Attending: NURSE PRACTITIONER
Payer: MEDICARE

## 2021-12-28 LAB
ANION GAP SERPL CALC-SCNC: 5 MMOL/L (ref 5–15)
APTT PPP: 31.7 SEC (ref 22.1–31)
APTT PPP: 38.5 SEC (ref 22.1–31)
ATRIAL RATE: 55 BPM
ATRIAL RATE: 83 BPM
BASOPHILS # BLD: 0 K/UL (ref 0–0.1)
BASOPHILS NFR BLD: 1 % (ref 0–1)
BUN SERPL-MCNC: 8 MG/DL (ref 6–20)
BUN/CREAT SERPL: 9 (ref 12–20)
CALCIUM SERPL-MCNC: 9 MG/DL (ref 8.5–10.1)
CALCULATED P AXIS, ECG09: 40 DEGREES
CALCULATED P AXIS, ECG09: 83 DEGREES
CALCULATED R AXIS, ECG10: -20 DEGREES
CALCULATED R AXIS, ECG10: 64 DEGREES
CALCULATED T AXIS, ECG11: -79 DEGREES
CALCULATED T AXIS, ECG11: -97 DEGREES
CHLORIDE SERPL-SCNC: 109 MMOL/L (ref 97–108)
CHOLEST SERPL-MCNC: 150 MG/DL
CO2 SERPL-SCNC: 23 MMOL/L (ref 21–32)
CREAT SERPL-MCNC: 0.87 MG/DL (ref 0.55–1.02)
DIAGNOSIS, 93000: NORMAL
DIAGNOSIS, 93000: NORMAL
DIFFERENTIAL METHOD BLD: ABNORMAL
ECHO AO ROOT DIAM: 3.1 CM
ECHO AO ROOT INDEX: 2.17 CM/M2
ECHO AR MAX VEL PISA: 4.3 M/S
ECHO AV PEAK GRADIENT: 5 MMHG
ECHO AV PEAK VELOCITY: 1.1 M/S
ECHO AV REGURGITANT PHT: 450.9 MILLISECOND
ECHO LA DIAMETER INDEX: 2.59 CM/M2
ECHO LA DIAMETER: 3.7 CM
ECHO LA TO AORTIC ROOT RATIO: 1.19
ECHO LA VOL 2C: 69 ML (ref 22–52)
ECHO LA VOL 4C: 80 ML (ref 22–52)
ECHO LA VOLUME AREA LENGTH: 80 ML
ECHO LA VOLUME INDEX A2C: 48 ML/M2 (ref 16–34)
ECHO LA VOLUME INDEX A4C: 56 ML/M2 (ref 16–34)
ECHO LA VOLUME INDEX AREA LENGTH: 56 ML/M2 (ref 16–34)
ECHO LV E' LATERAL VELOCITY: 7 CM/S
ECHO LV E' SEPTAL VELOCITY: 5 CM/S
ECHO LV EDV A2C: 96 ML
ECHO LV EDV A4C: 78 ML
ECHO LV EDV BP: 89 ML (ref 56–104)
ECHO LV EDV BP: 89 ML (ref 56–104)
ECHO LV EDV INDEX A4C: 55 ML/M2
ECHO LV EDV NDEX A2C: 67 ML/M2
ECHO LV EJECTION FRACTION A2C: 40 %
ECHO LV EJECTION FRACTION A4C: 47 %
ECHO LV EJECTION FRACTION BIPLANE: 42 % (ref 55–100)
ECHO LV EJECTION FRACTION BIPLANE: 42 % (ref 55–100)
ECHO LV ESV A2C: 58 ML
ECHO LV ESV A4C: 42 ML
ECHO LV ESV BP: 51 ML (ref 19–49)
ECHO LV ESV INDEX A2C: 41 ML/M2
ECHO LV ESV INDEX A4C: 29 ML/M2
ECHO LV ESV INDEX BP: 36 ML/M2
ECHO LV FRACTIONAL SHORTENING: 21 % (ref 28–44)
ECHO LV INTERNAL DIMENSION DIASTOLE INDEX: 2.94 CM/M2
ECHO LV INTERNAL DIMENSION DIASTOLIC: 4.2 CM (ref 3.9–5.3)
ECHO LV INTERNAL DIMENSION SYSTOLIC INDEX: 2.31 CM/M2
ECHO LV INTERNAL DIMENSION SYSTOLIC: 3.3 CM
ECHO LV IVSD: 1.1 CM (ref 0.6–0.9)
ECHO LV IVSS: 1.5 CM
ECHO LV MASS 2D: 189.2 G (ref 67–162)
ECHO LV MASS INDEX 2D: 132.3 G/M2 (ref 43–95)
ECHO LV POSTERIOR WALL DIASTOLIC: 1.4 CM (ref 0.6–0.9)
ECHO LV POSTERIOR WALL SYSTOLIC: 1.9 CM
ECHO LV RELATIVE WALL THICKNESS RATIO: 0.67
ECHO LVOT AREA: 3.1 CM2
ECHO LVOT DIAM: 2 CM
ECHO MV A VELOCITY: 0.6 M/S
ECHO MV E DECELERATION TIME (DT): 160 MS
ECHO MV E VELOCITY: 0.49 M/S
ECHO MV E/A RATIO: 0.82
ECHO MV E/E' LATERAL: 7
ECHO MV E/E' RATIO (AVERAGED): 8.4
ECHO MV E/E' SEPTAL: 9.8
ECHO PULMONARY ARTERY END DIASTOLIC PRESSURE: 5 MMHG
ECHO PV MAX VELOCITY: 0.7 M/S
ECHO PV PEAK GRADIENT: 2 MMHG
ECHO PV REGURGITANT MAX VELOCITY: 1.1 M/S
ECHO RV FREE WALL PEAK S': 13 CM/S
ECHO RV INTERNAL DIMENSION: 3 CM
ECHO TV REGURGITANT MAX VELOCITY: 1.95 M/S
ECHO TV REGURGITANT PEAK GRADIENT: 15 MMHG
EOSINOPHIL # BLD: 0.2 K/UL (ref 0–0.4)
EOSINOPHIL NFR BLD: 3 % (ref 0–7)
ERYTHROCYTE [DISTWIDTH] IN BLOOD BY AUTOMATED COUNT: 13.4 % (ref 11.5–14.5)
GLUCOSE SERPL-MCNC: 214 MG/DL (ref 65–100)
HCT VFR BLD AUTO: 34.9 % (ref 35–47)
HDLC SERPL-MCNC: 64 MG/DL
HDLC SERPL: 2.3 {RATIO} (ref 0–5)
HGB BLD-MCNC: 11.6 G/DL (ref 11.5–16)
IMM GRANULOCYTES # BLD AUTO: 0 K/UL (ref 0–0.04)
IMM GRANULOCYTES NFR BLD AUTO: 0 % (ref 0–0.5)
LDLC SERPL CALC-MCNC: 68.2 MG/DL (ref 0–100)
LYMPHOCYTES # BLD: 2.8 K/UL (ref 0.8–3.5)
LYMPHOCYTES NFR BLD: 37 % (ref 12–49)
MAGNESIUM SERPL-MCNC: 1.8 MG/DL (ref 1.6–2.4)
MCH RBC QN AUTO: 30.4 PG (ref 26–34)
MCHC RBC AUTO-ENTMCNC: 33.2 G/DL (ref 30–36.5)
MCV RBC AUTO: 91.4 FL (ref 80–99)
MONOCYTES # BLD: 0.7 K/UL (ref 0–1)
MONOCYTES NFR BLD: 9 % (ref 5–13)
NEUTS SEG # BLD: 3.7 K/UL (ref 1.8–8)
NEUTS SEG NFR BLD: 50 % (ref 32–75)
NRBC # BLD: 0 K/UL (ref 0–0.01)
NRBC BLD-RTO: 0 PER 100 WBC
P-R INTERVAL, ECG05: 124 MS
P-R INTERVAL, ECG05: 128 MS
PHOSPHATE SERPL-MCNC: 5.9 MG/DL (ref 2.6–4.7)
PLATELET # BLD AUTO: 228 K/UL (ref 150–400)
PMV BLD AUTO: 9.1 FL (ref 8.9–12.9)
POTASSIUM SERPL-SCNC: 3.6 MMOL/L (ref 3.5–5.1)
Q-T INTERVAL, ECG07: 404 MS
Q-T INTERVAL, ECG07: 496 MS
QRS DURATION, ECG06: 84 MS
QRS DURATION, ECG06: 94 MS
QTC CALCULATION (BEZET), ECG08: 474 MS
QTC CALCULATION (BEZET), ECG08: 474 MS
RBC # BLD AUTO: 3.82 M/UL (ref 3.8–5.2)
SODIUM SERPL-SCNC: 137 MMOL/L (ref 136–145)
STRESS BASELINE DIAS BP: 68 MMHG
STRESS BASELINE HR: 54 BPM
STRESS BASELINE SYS BP: 125 MMHG
STRESS ESTIMATED WORKLOAD: 1 METS
STRESS EXERCISE DUR MIN: NORMAL
STRESS O2 SAT PEAK: 100 %
STRESS O2 SAT REST: 100 %
STRESS PEAK DIAS BP: 71 MMHG
STRESS PEAK SYS BP: 131 MMHG
STRESS PERCENT HR ACHIEVED: 68 %
STRESS POST PEAK HR: 96 BPM
STRESS RATE PRESSURE PRODUCT: NORMAL BPM*MMHG
STRESS TARGET HR: 141 BPM
THERAPEUTIC RANGE,PTTT: ABNORMAL SECS (ref 58–77)
THERAPEUTIC RANGE,PTTT: ABNORMAL SECS (ref 58–77)
TRIGL SERPL-MCNC: 89 MG/DL (ref ?–150)
TROPONIN-HIGH SENSITIVITY: 18 NG/L (ref 0–51)
VENTRICULAR RATE, ECG03: 55 BPM
VENTRICULAR RATE, ECG03: 83 BPM
VLDLC SERPL CALC-MCNC: 17.8 MG/DL
WBC # BLD AUTO: 7.5 K/UL (ref 3.6–11)

## 2021-12-28 PROCEDURE — APPSS30 APP SPLIT SHARED TIME 16-30 MINUTES: Performed by: NURSE PRACTITIONER

## 2021-12-28 PROCEDURE — 84100 ASSAY OF PHOSPHORUS: CPT

## 2021-12-28 PROCEDURE — 74011250636 HC RX REV CODE- 250/636: Performed by: EMERGENCY MEDICINE

## 2021-12-28 PROCEDURE — 93306 TTE W/DOPPLER COMPLETE: CPT

## 2021-12-28 PROCEDURE — 96376 TX/PRO/DX INJ SAME DRUG ADON: CPT

## 2021-12-28 PROCEDURE — 85025 COMPLETE CBC W/AUTO DIFF WBC: CPT

## 2021-12-28 PROCEDURE — 93005 ELECTROCARDIOGRAM TRACING: CPT

## 2021-12-28 PROCEDURE — 93017 CV STRESS TEST TRACING ONLY: CPT

## 2021-12-28 PROCEDURE — 36415 COLL VENOUS BLD VENIPUNCTURE: CPT

## 2021-12-28 PROCEDURE — 80061 LIPID PANEL: CPT

## 2021-12-28 PROCEDURE — 74011250637 HC RX REV CODE- 250/637: Performed by: EMERGENCY MEDICINE

## 2021-12-28 PROCEDURE — 74011250636 HC RX REV CODE- 250/636: Performed by: STUDENT IN AN ORGANIZED HEALTH CARE EDUCATION/TRAINING PROGRAM

## 2021-12-28 PROCEDURE — 74011250636 HC RX REV CODE- 250/636: Performed by: NURSE PRACTITIONER

## 2021-12-28 PROCEDURE — 83735 ASSAY OF MAGNESIUM: CPT

## 2021-12-28 PROCEDURE — 93306 TTE W/DOPPLER COMPLETE: CPT | Performed by: INTERNAL MEDICINE

## 2021-12-28 PROCEDURE — G0378 HOSPITAL OBSERVATION PER HR: HCPCS

## 2021-12-28 PROCEDURE — 74011250636 HC RX REV CODE- 250/636: Performed by: INTERNAL MEDICINE

## 2021-12-28 PROCEDURE — 80048 BASIC METABOLIC PNL TOTAL CA: CPT

## 2021-12-28 PROCEDURE — 85730 THROMBOPLASTIN TIME PARTIAL: CPT

## 2021-12-28 PROCEDURE — 74011250637 HC RX REV CODE- 250/637: Performed by: INTERNAL MEDICINE

## 2021-12-28 PROCEDURE — 74011250637 HC RX REV CODE- 250/637: Performed by: NURSE PRACTITIONER

## 2021-12-28 PROCEDURE — A9500 TC99M SESTAMIBI: HCPCS

## 2021-12-28 PROCEDURE — 96375 TX/PRO/DX INJ NEW DRUG ADDON: CPT

## 2021-12-28 RX ORDER — TETRAKIS(2-METHOXYISOBUTYLISOCYANIDE)COPPER(I) TETRAFLUOROBORATE 1 MG/ML
30 INJECTION, POWDER, LYOPHILIZED, FOR SOLUTION INTRAVENOUS
Status: COMPLETED | OUTPATIENT
Start: 2021-12-28 | End: 2021-12-28

## 2021-12-28 RX ORDER — TETRAKIS(2-METHOXYISOBUTYLISOCYANIDE)COPPER(I) TETRAFLUOROBORATE 1 MG/ML
10 INJECTION, POWDER, LYOPHILIZED, FOR SOLUTION INTRAVENOUS
Status: COMPLETED | OUTPATIENT
Start: 2021-12-28 | End: 2021-12-28

## 2021-12-28 RX ORDER — SODIUM CHLORIDE 0.9 % (FLUSH) 0.9 %
10 SYRINGE (ML) INJECTION AS NEEDED
Status: DISCONTINUED | OUTPATIENT
Start: 2021-12-28 | End: 2022-01-01 | Stop reason: HOSPADM

## 2021-12-28 RX ORDER — MORPHINE SULFATE 2 MG/ML
1 INJECTION, SOLUTION INTRAMUSCULAR; INTRAVENOUS ONCE
Status: COMPLETED | OUTPATIENT
Start: 2021-12-28 | End: 2021-12-28

## 2021-12-28 RX ADMIN — METOPROLOL TARTRATE 25 MG: 25 TABLET, FILM COATED ORAL at 09:25

## 2021-12-28 RX ADMIN — Medication 10 ML: at 12:16

## 2021-12-28 RX ADMIN — Medication 10 ML: at 05:55

## 2021-12-28 RX ADMIN — ACETAMINOPHEN 650 MG: 325 TABLET ORAL at 17:40

## 2021-12-28 RX ADMIN — Medication 10 ML: at 05:50

## 2021-12-28 RX ADMIN — Medication 10 ML: at 22:10

## 2021-12-28 RX ADMIN — TETRAKIS(2-METHOXYISOBUTYLISOCYANIDE)COPPER(I) TETRAFLUOROBORATE 30 MILLICURIE: 1 INJECTION, POWDER, LYOPHILIZED, FOR SOLUTION INTRAVENOUS at 12:15

## 2021-12-28 RX ADMIN — ASPIRIN 81 MG CHEWABLE TABLET 81 MG: 81 TABLET CHEWABLE at 09:24

## 2021-12-28 RX ADMIN — ALPRAZOLAM 1 MG: 0.5 TABLET ORAL at 17:40

## 2021-12-28 RX ADMIN — TRAZODONE HYDROCHLORIDE 50 MG: 50 TABLET ORAL at 22:10

## 2021-12-28 RX ADMIN — ATORVASTATIN CALCIUM 40 MG: 40 TABLET, FILM COATED ORAL at 22:10

## 2021-12-28 RX ADMIN — METOPROLOL TARTRATE 25 MG: 25 TABLET, FILM COATED ORAL at 22:10

## 2021-12-28 RX ADMIN — TETRAKIS(2-METHOXYISOBUTYLISOCYANIDE)COPPER(I) TETRAFLUOROBORATE 10 MILLICURIE: 1 INJECTION, POWDER, LYOPHILIZED, FOR SOLUTION INTRAVENOUS at 10:10

## 2021-12-28 RX ADMIN — CILOSTAZOL 50 MG: 100 TABLET ORAL at 13:49

## 2021-12-28 RX ADMIN — PANTOPRAZOLE SODIUM 40 MG: 40 TABLET, DELAYED RELEASE ORAL at 09:24

## 2021-12-28 RX ADMIN — HEPARIN SODIUM 3000 UNITS: 1000 INJECTION INTRAVENOUS; SUBCUTANEOUS at 07:32

## 2021-12-28 RX ADMIN — REGADENOSON 0.4 MG: 0.08 INJECTION, SOLUTION INTRAVENOUS at 12:16

## 2021-12-28 RX ADMIN — MORPHINE SULFATE 1 MG: 2 INJECTION, SOLUTION INTRAMUSCULAR; INTRAVENOUS at 22:10

## 2021-12-28 RX ADMIN — MONTELUKAST 10 MG: 10 TABLET, FILM COATED ORAL at 09:24

## 2021-12-28 NOTE — PROGRESS NOTES
[facility]: Same Day [facility] Cardiac Stress competed. Please check with ordering Physician regarding pt diet.  Results will follow

## 2021-12-28 NOTE — PROGRESS NOTES
[address] [place] [address]  [phone]      Cardiology Progress Note      12/28/2021 940 AM    Admit Date: 12/27/2021    Admit Diagnosis:   Chest pain [R07.9]    Interval History/Subjective:     Tristen Wilhelm is a 80 y.o. female with PMH CAD, HLD, COPD, lung cancer s/p lung resection, PVD, orthostatic hypotension who presented to the ED with chest pain. -VSS  -troponin 30 and 18;  proBNP 884; procal low  -Ms. Hatchett is not feeling well. She's c/o pain in her abd. No current chest pain, but did have some chest pain earlier today for a moment. No SOB. Visit Vitals  /61 (BP 1 Location: Right upper arm, BP Patient Position: At rest)   Pulse 78   Temp 97.8 °F (36.6 °C)   Resp 15   Ht 4' 11\" (1.499 m)   Wt 50 kg (110 lb 3.7 oz)   SpO2 100%   BMI 22.26 kg/m²       Current Facility-Administered Medications   Medication Dose Route Frequency    sodium chloride (NS) flush 5-40 mL  5-40 mL IntraVENous Q8H    sodium chloride (NS) flush 5-40 mL  5-40 mL IntraVENous PRN    nitroglycerin (NITROSTAT) tablet 0.4 mg  0.4 mg SubLINGual Q5MIN PRN    heparin 25,000 units in D5W 250 ml infusion  9-25 Units/kg/hr IntraVENous TITRATE    heparin (porcine) 1,000 unit/mL injection 1,500 Units  30 Units/kg IntraVENous PRN    Or    heparin (porcine) 1,000 unit/mL injection 3,000 Units  60 Units/kg IntraVENous PRN    metoprolol tartrate (LOPRESSOR) tablet 25 mg  25 mg Oral Q12H    aspirin chewable tablet 81 mg  81 mg Oral DAILY    acetaminophen (TYLENOL) tablet 650 mg  650 mg Oral Q6H PRN    ALPRAZolam (XANAX) tablet 1 mg  1 mg Oral QPM    cilostazoL (PLETAL) tablet 50 mg  50 mg Oral DAILY    . PHARMACY TO SUBSTITUTE PER PROTOCOL (Reordered from: citalopram hydrobromide (CITALOPRAM PO))    Per Protocol    montelukast (SINGULAIR) tablet 10 mg  10 mg Oral DAILY    pantoprazole (PROTONIX) tablet 40 mg  40 mg Oral ACB    traZODone (DESYREL) tablet 50 mg  50 mg Oral QHS    atorvastatin (LIPITOR) tablet 40 mg  40 mg Oral QHS       Objective:      Physical Exam:  General Appearance:  awake, cooperative, thin, elderly AAF resting in bed in NAD; appears stated age  Eyes: sclera anicteric  Mouth/Throat: moist mucous membranes; oral pharynx clear  Neck: supple;   Pulmonary:  clear to auscultation bilaterally; good effort  Cardiovascular: regular rate and rhythm; no murmur, click, rub, or gallop  Abdomen: soft, non-distended; bowel sounds normal  Musculoskeletal: no swelling or deformity; moves all extremities  Extremities: no edema; palpable distal pulses   Skin: warm and dry  Neuro: grossly normal.  Occasionally slow to respond  Psych: flat          Data Review:   Recent Labs     12/28/21  0326 12/27/21 2025 12/27/21  1639   WBC 7.5 7.8 6.2   HGB 11.6 12.5 12.6   HCT 34.9* 36.8 37.2    245 246     Recent Labs     12/28/21  0326 12/27/21  1517    140   K 3.6 3.3*   * 109*   CO2 23 23   * 97   BUN 8 8   CREA 0.87 0.89   CA 9.0 10.2*   MG 1.8  --    PHOS 5.9*  --    ALB  --  3.9   TBILI  --  0.9   ALT  --  18       No results for input(s): TROIQ, CPK, CKMB in the last 72 hours. No intake or output data in the 24 hours ending 12/28/21 1022     Telemetry: SR  ECG: SR with ST and T wave changes inferolateral slightly worse than 07/21   Echocardiogram:  ordered   CXRAY: \"Hazy opacification of the left mid and lower lung, consistent with  Pneumonia. \"    Assessment:     Active Problems:    Atypical chest pain (12/27/2021)      Primary hypertension (12/27/2021)      Chest pain (12/27/2021)        Plan:     Atypical chest pain:  Intermittent. Unstable angina vs hypertensive urgency vs musculoskeletal pain. troponins low. Ischemic changes on EKG  · Repeat EKG now that HTN improved  · ECHO pending  · Heparin drip overnight.   Will d/c.    · ASA, statin, BB with CAD history  · Plan for stress test today      Hypertensive urgency:  BP now normal.  History of orthostatic hypotension in the past  · Continue BB      Abdominal pain:  Nothing significant on US yesterday  · Per hospitalist        Sergey Aranda, CARLOS  DNP, RN, AGACNP-BC

## 2021-12-28 NOTE — ED NOTES
Patient is being transferred to [address], Room # 2107. Report given to floor RN on [address] for routine progression of care. Report consisted of the following information SBAR, Kardex, ED Summary and MAR. Patient transferred to receiving unit by: RN (RN or tech name). Next labs due: Yes    The following personal items will be sent with the patient during transfer to the floor: All valuables:    Cardiac monitoring ordered: Yes    The following CURRENT information was reported to the receiving RN:    Code status: Full Code at time of transfer    Last set of vital signs:  Vital Signs  Level of Consciousness: Alert (0) (12/27/21 1500)  Temp: 98.3 °F (36.8 °C) (12/27/21 1500)  Temp Source: Oral (12/27/21 1500)  Pulse (Heart Rate): 71 (12/28/21 0346)  Heart Rate Source: Monitor (12/27/21 1500)  Cardiac Rhythm: Sinus Rhythm (12/27/21 1511)  Resp Rate: 12 (12/28/21 0346)  BP: 120/62 (12/28/21 0201)  MAP (Monitor): 81 (12/28/21 0201)  MAP (Calculated): 81 (12/28/21 0201)  MEWS Score: 2 (12/27/21 1500)         Oxygen Therapy  O2 Sat (%): 96 % (12/28/21 0346)  Pulse via Oximetry: 59 beats per minute (12/28/21 0346)  O2 Device: None (Room air) (12/27/21 1511)      Last pain assessment:       Urinary catheter: voiding  Is there a cristobal order: No     LDAs:       Peripheral IV 12/27/21 Left;Posterior Hand (Active)       Peripheral IV 12/27/21 Left;Posterior Forearm (Active)         Opportunity for questions and clarification was provided.     Charley Banks RN

## 2021-12-28 NOTE — H&P
Hospitalist Admission Note    NAME: Pura Caal   :  1940   MRN:  [id number]     Date/Time:  2021 8:31 PM    Patient PCP: Unknown, Provider, CEFERINO  _____________________________________________________________________  Given the patient's current clinical presentation, I have a high level of concern for decompensation if discharged from the emergency department. Complex decision making was performed, which includes reviewing the patient's available past medical records, laboratory results, and x-ray films. My assessment of this patient's clinical condition and my plan of care is as follows. Assessment / Plan:  Chest pain  CAD  Admit to tele  Cardiology consulted   1st trop 30 Repeat Trop  Check ECHO  C/w heparin gtt  ASA, metoprolol 25 mg PO Q12  To consider stress test vs cardiac cath. NPO PMN  CXR showed Hazy opacification of the left mid and lower lung, consistent with  Pneumonia. Does not appear to have a clinical PNA. Check PCT  Hold off on additional abx for now. Monitor for fevers and leukocytosis  C/w Pletal  High intensity statin  EKG with inferolateral ST/T wave changes     Dementia? Delirium precautions    HTN  C/w BB    COPD  Stable, no home Rx? PUD  C/w PPI    Code Status: Full  Surrogate Decision Maker: Davy Boggs    DVT Prophylaxis: heparin gtt  GI Prophylaxis: not indicated    Baseline: Independent        Subjective:   CHIEF COMPLAINT:  Chest pain    HISTORY OF PRESENT ILLNESS:     Devika Hickman is a 80 y.o. female with a past medical history significant for CAD, lung cancer, hypertension, GERD and questionable dementia who presents to the ED with chest pain. Patient is not a great historian but says the chest pain has been going on for the past 3 to 4 days. She describes the chest pain as crampy and located in the left side. It last for about 30 minutes. There is no radiation. She does have associated shortness of breath.   Patient was recently in [place] visiting her son. She states the pain started on her way home while she was on the plane. She states that the pain will happen mostly at night but happens about 3-4 times a day. She says this has never happened before. No fevers/chills, nausea/vomiting or diarrhea. We were asked to admit for work up and evaluation of the above problems.      Past Medical History:   Diagnosis Date    Anxiety     CAD (coronary artery disease) 10/11/2012    Cancer ([facility] [address]     lung    Cancer ([address]     lung CA    Chronic obstructive pulmonary disease (HCC)     Chronic pain     legs and lower back    COPD     Depression     Dyslipidemia 10/11/2012    Endocrine disease     Gastrointestinal disorder     gastric ulcers    GERD (gastroesophageal reflux disease)     Headache(784.0)     Hematuria     Hypertension     Lung cancer ([address]     Malignant neoplasm of bronchus ([address]     Meningitis 2010    Psychiatric disorder     depression    Psychiatric disorder     depression, anxiety    PUD (peptic ulcer disease)         Past Surgical History:   Procedure Laterality Date    DELIVERY       HX BREAST BIOPSY Left     neg path    HX GI      reflux surgery    HX GYN       3 c-sections    HX HYSTERECTOMY      HX OTHER SURGICAL      lobectomy    HX UROLOGICAL      stone rmvl with lithotripsy    ND CARDIAC SURG PROCEDURE UNLIST      SHARON placed 10- by Dr. Shirley Serrano      cardiac stents    Lopez Dequan      left upper lobe ectomy secondary to carcinoma    ND REMOVE LUNG/CHEST WALL      VASCULAR SURGERY PROCEDURE UNLIST      fem-fem bypass    VASCULAR SURGERY PROCEDURE UNLIST      leg stents       Social History     Tobacco Use    Smoking status: Former Smoker    Smokeless tobacco: Not on file   Substance Use Topics    Alcohol use: Yes     Comment: occasional glass of wine        Family History   Problem Relation Age of Onset    Cancer Mother         colon    Cancer Father         prostate    Cancer Sister         breast    Breast Cancer Sister     Cancer Brother         non-hodgkin's lymphoma     No Known Allergies     Prior to Admission medications    Medication Sig Start Date End Date Taking? Authorizing Provider   omeprazole (PRILOSEC) 20 mg capsule Take 20 mg by mouth daily. Oswald, MD Hugh   naproxen (NAPROSYN) 375 mg tablet Take 375 mg by mouth as needed. 7/13/21   Hugh Akers MD   cilostazoL (PLETAL) 50 mg tablet TAKE 1 TABLET BY MOUTH ONCE DAILY 7/4/21   Hugh Akers MD   citalopram hydrobromide (CITALOPRAM PO) Take  by mouth daily. Indications: Patient unsure of the dosage    Hugh Akers MD   clopidogrel (PLAVIX) 75 mg tab TAKE 1 TABLET BY MOUTH DAILY  Patient not taking: Reported on 7/21/2021 11/7/17   Chio Canela MD   HYDROcodone-acetaminophen (NORCO) 7.5-325 mg per tablet Take  by mouth two (2) times daily as needed for Pain. Patient not taking: Reported on 7/21/2021    Provider, Historical   ondansetron hcl (ZOFRAN, AS HYDROCHLORIDE,) 4 mg tablet Take 4 mg by mouth every eight (8) hours as needed for Nausea. Provider, Historical   clopidogrel (PLAVIX) 75 mg tab TAKE 1 TABLET BY MOUTH DAILY  Patient not taking: Reported on 7/21/2021 5/10/17   Chio Canela MD   montelukast (SINGULAIR) 10 mg tablet Take 10 mg by mouth daily. Provider, Historical   pantoprazole (PROTONIX) 20 mg tablet Take 20 mg by mouth daily. Patient not taking: Reported on 7/21/2021    Provider, Historical   cholecalciferol, vitamin D3, 2,000 unit tab Take  by mouth every other day. Patient not taking: Reported on 7/21/2021    Provider, Historical   polyethylene glycol (MIRALAX) 17 gram/dose powder Take 17 g by mouth every other day. Patient not taking: Reported on 7/21/2021    Provider, Historical   aspirin delayed-release 81 mg tablet Take  by mouth daily.     Provider, Historical   QUEtiapine (SEROQUEL) 100 mg tablet Take 50 mg by mouth nightly. Patient not taking: Reported on 7/21/2021    Hugh Akers MD   escitalopram oxalate (LEXAPRO) 10 mg tablet Take 10 mg by mouth daily. Patient not taking: Reported on 7/21/2021    ProviderLashay   simvastatin (ZOCOR) 40 mg tablet Take 40 mg by mouth nightly. Hugh Akers MD   promethazine (PHENERGAN) 25 mg tablet Take 1 tablet by mouth every six (6) hours as needed for Nausea. Patient not taking: Reported on 7/21/2021 11/25/14   Durga Sarabia MD   ALPRAZolam Otelia Adjutant) 1 mg tablet Take 1 mg by mouth every evening. Hugh Akers MD   traZODone (DESYREL) 100 mg tablet Take 50 mg by mouth nightly. Hugh Akers MD   tiotropium (SPIRIVA WITH HANDIHALER) 18 mcg inhalation capsule Take 1 Cap by inhalation daily. Hugh kAers MD       REVIEW OF SYSTEMS:     I am not able to complete the review of systems because:    The patient is intubated and sedated    The patient has altered mental status due to his acute medical problems    The patient has baseline aphasia from prior stroke(s)    The patient has baseline dementia and is not reliable historian    The patient is in acute medical distress and unable to provide information           Total of 12 systems reviewed as follows:       POSITIVE= underlined text  Negative = text not underlined  General:  fever, chills, sweats, generalized weakness, weight loss/gain,      loss of appetite   Eyes:    blurred vision, eye pain, loss of vision, double vision  ENT:    rhinorrhea, pharyngitis   Respiratory:   cough, sputum production, SOB, LOPEZ, wheezing, pleuritic pain     Gastrointestinal:  abdominal pain , N/V, diarrhea, dysphagia, constipation, bleeding   Genitourinary:  frequency, urgency, dysuria, hematuria, incontinence   Muskuloskeletal :  arthralgia, myalgia, back pain  Hematology:  easy bruising, nose or gum bleeding, lymphadenopathy   Dermatological: rash, ulceration, pruritis, color change / jaundice  Endocrine:   hot flashes or polydipsia   Neurological:  headache, dizziness, confusion, focal weakness, paresthesia,     Speech difficulties, memory loss, gait difficulty  Psychological: Feelings of anxiety, depression, agitation    Objective:   VITALS:    Visit Vitals  BP (!) 187/84   Pulse 80   Temp 98.3 °F (36.8 °C)   Resp 19   Ht 4' 11\" (1.499 m)   Wt 50 kg (110 lb 3.7 oz)   SpO2 100%   BMI 22.26 kg/m²       PHYSICAL EXAM:    General:    Alert, cooperative, no distress, appears stated age. HEENT: Atraumatic, anicteric sclerae, pink conjunctivae     No oral ulcers, mucosa moist, throat clear, dentition fair  Neck:  Supple, symmetrical,  thyroid: non tender  Lungs:   Clear to auscultation bilaterally. No Wheezing or Rhonchi. No rales. Chest wall:  Mild tenderness to the left chest wall. No Accessory muscle use. Heart:   Regular  rhythm,  No  murmur   No edema  Abdomen:   Soft, non-tender. Not distended. Bowel sounds normal  Extremities: No cyanosis. No clubbing,      Skin turgor normal, Capillary refill normal, Radial dial pulse 2+  Skin:     Not pale. Not Jaundiced  No rashes   Psych:  Good insight. Not depressed. Not anxious or agitated. Neurologic: EOMs intact. No facial asymmetry. No aphasia or slurred speech. Symmetrical strength, Sensation grossly intact.  Alert and oriented X 2     _______________________________________________________________________  Care Plan discussed with:    Comments   Patient c    Family      RN c    Care Manager                    Consultant:      _______________________________________________________________________  Expected  Disposition:   Home with Family c   HH/PT/OT/RN    SNF/LTC    MARINO    ________________________________________________________________________  TOTAL TIME:  31  Minutes    Critical Care Provided     Minutes non procedure based      Comments     Reviewed previous records   >50% of visit spent in counseling and coordination of care  Discussion with patient and/or family and questions answered       Given the patient's current clinical presentation, I have a high level of concern for decompensation if discharged from the ED. Complex decision making was performed which includes reviewing the patient's available past medical records, laboratory results, and Xray films. I have also directly communicated my plan and discussed this case with the involved ED physician.     ____________________________________________________________________  Martha Perez MD    Procedures: see electronic medical records for all procedures/Xrays and details which were not copied into this note but were reviewed prior to creation of Plan. LAB DATA REVIEWED:    Recent Results (from the past 24 hour(s))   COVID-19 RAPID TEST    Collection Time: 12/27/21  3:14 PM   Result Value Ref Range    Specimen source Nasopharyngeal      COVID-19 rapid test Not detected NOTD     SAMPLES BEING HELD    Collection Time: 12/27/21  3:17 PM   Result Value Ref Range    SAMPLES BEING HELD BLUE     COMMENT        Add-on orders for these samples will be processed based on acceptable specimen integrity and analyte stability, which may vary by analyte. LIPASE    Collection Time: 12/27/21  3:17 PM   Result Value Ref Range    Lipase 95 73 - 690 U/L   METABOLIC PANEL, COMPREHENSIVE    Collection Time: 12/27/21  3:17 PM   Result Value Ref Range    Sodium 140 136 - 145 mmol/L    Potassium 3.3 (L) 3.5 - 5.1 mmol/L    Chloride 109 (H) 97 - 108 mmol/L    CO2 23 21 - 32 mmol/L    Anion gap 8 5 - 15 mmol/L    Glucose 97 65 - 100 mg/dL    BUN 8 6 - 20 MG/DL    Creatinine 0.89 0.55 - 1.02 MG/DL    BUN/Creatinine ratio 9 (L) 12 - 20      GFR est AA >60 >60 ml/min/1.73m2    GFR est non-AA >60 >60 ml/min/1.73m2    Calcium 10.2 (H) 8.5 - 10.1 MG/DL    Bilirubin, total 0.9 0.2 - 1.0 MG/DL    ALT (SGPT) 18 12 - 78 U/L    AST (SGOT) 23 15 - 37 U/L    Alk.  phosphatase 87 45 - 117 U/L    Protein, total 8.6 (H) 6.4 - 8.2 g/dL    Albumin 3.9 3.5 - 5.0 g/dL    Globulin 4.7 (H) 2.0 - 4.0 g/dL    A-G Ratio 0.8 (L) 1.1 - 2.2     TROPONIN-HIGH SENSITIVITY    Collection Time: 12/27/21  3:17 PM   Result Value Ref Range    Troponin-High Sensitivity 30 0 - 51 ng/L   CBC WITH AUTOMATED DIFF    Collection Time: 12/27/21  4:39 PM   Result Value Ref Range    WBC 6.2 3.6 - 11.0 K/uL    RBC 4.08 3.80 - 5.20 M/uL    HGB 12.6 11.5 - 16.0 g/dL    HCT 37.2 35.0 - 47.0 %    MCV 91.2 80.0 - 99.0 FL    MCH 30.9 26.0 - 34.0 PG    MCHC 33.9 30.0 - 36.5 g/dL    RDW 13.1 11.5 - 14.5 %    PLATELET 720 936 - 913 K/uL    MPV 9.1 8.9 - 12.9 FL    NRBC 0.0 0  WBC    ABSOLUTE NRBC 0.00 0.00 - 0.01 K/uL    NEUTROPHILS 53 32 - 75 %    LYMPHOCYTES 35 12 - 49 %    MONOCYTES 10 5 - 13 %    EOSINOPHILS 1 0 - 7 %    BASOPHILS 1 0 - 1 %    IMMATURE GRANULOCYTES 0 0.0 - 0.5 %    ABS. NEUTROPHILS 3.3 1.8 - 8.0 K/UL    ABS. LYMPHOCYTES 2.1 0.8 - 3.5 K/UL    ABS. MONOCYTES 0.6 0.0 - 1.0 K/UL    ABS. EOSINOPHILS 0.1 0.0 - 0.4 K/UL    ABS. BASOPHILS 0.0 0.0 - 0.1 K/UL    ABS. IMM.  GRANS. 0.0 0.00 - 0.04 K/UL    DF AUTOMATED     POC LACTIC ACID    Collection Time: 12/27/21  7:30 PM   Result Value Ref Range    Lactic Acid (POC) 0.86 0.40 - 2.00 mmol/L

## 2021-12-28 NOTE — ED NOTES
PTT hemolyzed per lab, this RN called pharmacy to verify heparin gtt rate as baseline ptt was not resulted.  Per pharmacy collect ptt now

## 2021-12-28 NOTE — ED NOTES
Called pharmacy for pts critical PTT level, was advised to pause administration and redraw her PTT in 2 hours

## 2021-12-28 NOTE — ED NOTES
Called pharmacy to get an updated rate on the pts heparin, was advised to restart the rate at 9 units/kg/hr

## 2021-12-28 NOTE — ED NOTES
Called to give pt report, was advised the receiving nurse would call back, provided them wall call back number

## 2021-12-28 NOTE — ED NOTES
pts complains of pain from ultrasound IV, she is refusing another IV at this time, I spoke with the hospitalist in regards to her IV antibiotics, hospitalist states it is okay to discontinue them at this time.

## 2021-12-28 NOTE — PROGRESS NOTES
Bedside and Verbal shift change report given to Araceli Cohen (oncoming nurse) by True Mosquera (offgoing nurse). Report included the following information SBAR, Kardex, MAR and Recent Results.

## 2021-12-28 NOTE — PROGRESS NOTES
Hospitalist Progress Note    NAME: Adams Savage   :  1940   MRN:  [id number]       Assessment / Plan:    Chest pain  CAD  Admit to tele  Cardiology consulted-appreciate input. Troponin trended negative  Echo pending. C/w heparin gtt  ASA, metoprolol 25 mg PO Q12  Stress test done today, reports are pending. CXR showed Hazy opacification of the left mid and lower lung, consistent with  Pneumonia. Procalcitonin <0.05 so no need for the antibiotics. Febrile. C/w Pletal  High intensity statin  EKG with inferolateral ST/T wave changes      Dementia? Delirium precautions     HTN  C/w BB     COPD  Stable, no home Rx?     PUD  C/w PPI    18.5 - 24.9 Normal weight / Body mass index is 22.26 kg/m². Estimated discharge date:   Barriers: Cardiology clearance    Code status: Full  Prophylaxis: Hep SQ  Recommended Disposition: Home w/Family     Subjective:     Chief Complaint / Reason for Physician Visit  Patient seen today, doing fine initially was not complaining of chest pain but later on complained of chest pain only for few minutes. Discussed with RN events overnight. Objective:     VITALS:   Last 24hrs VS reviewed since prior progress note.  Most recent are:  Patient Vitals for the past 24 hrs:   Temp Pulse Resp BP SpO2   21 1124 -- -- -- 125/61 --   21 0903 97.8 °F (36.6 °C) 78 15 125/61 100 %   21 0437 97.8 °F (36.6 °C) 72 14 129/61 96 %   21 0400 97.9 °F (36.6 °C) 67 13 (!) 101/53 96 %   21 0346 -- 71 12 -- 96 %   21 0201 -- 71 14 120/62 97 %   21 0001 -- 82 25 (!) 141/60 99 %   21 2316 -- 79 15 (!) 142/66 95 %   21 -- 87 -- (!) 155/84 --   21 -- 75 15 (!) 155/84 100 %   21 190 -- 83 22 -- 100 %   21 -- 77 14 -- 100 %   21 1701 -- 76 25 -- 98 %   21 1601 -- 80 19 (!) 187/84 100 %   21 153 -- 90 -- (!) 193 --   21 152 -- 90 17 (!)  100 %   21 1511 -- -- -- -- 100 %   12/27/21 1500 98.3 °F (36.8 °C) 82 25 (!) 193/84 100 %     No intake or output data in the 24 hours ending 12/28/21 1248     I had a face to face encounter and independently examined this patient on 12/28/2021, as outlined below:  PHYSICAL EXAM:  General: WD, WN. Alert, cooperative, no acute distress    EENT:  EOMI. Anicteric sclerae. MMM  Resp:  CTA bilaterally, no wheezing or rales. No accessory muscle use  CV:  Regular  rhythm,  No edema  GI:  Soft, Non distended, Non tender. +Bowel sounds  Neurologic:  Alert and oriented X 3, normal speech,   Psych:   Good insight. Not anxious nor agitated  Skin:  No rashes. No jaundice    Reviewed most current lab test results and cultures  YES  Reviewed most current radiology test results   YES  Review and summation of old records today    NO  Reviewed patient's current orders and MAR    YES  PMH/SH reviewed - no change compared to H&P  ________________________________________________________________________  Care Plan discussed with:    Comments   Patient X    Family      RN X    Care Manager     Consultant                       X Multidiciplinary team rounds were held today with , nursing, pharmacist and clinical coordinator. Patient's plan of care was discussed; medications were reviewed and discharge planning was addressed. ________________________________________________________________________  Total NON critical care TIME:  39   Minutes    Total CRITICAL CARE TIME Spent:   Minutes non procedure based      Comments   >50% of visit spent in counseling and coordination of care     ________________________________________________________________________  Brigid Riedel, MD     Procedures: see electronic medical records for all procedures/Xrays and details which were not copied into this note but were reviewed prior to creation of Plan. LABS:  I reviewed today's most current labs and imaging studies.   Pertinent labs include:  Recent Labs     12/28/21 0326 12/27/21 2025 12/27/21  1639   WBC 7.5 7.8 6.2   HGB 11.6 12.5 12.6   HCT 34.9* 36.8 37.2    245 246     Recent Labs     12/28/21 0326 12/27/21  1517    140   K 3.6 3.3*   * 109*   CO2 23 23   * 97   BUN 8 8   CREA 0.87 0.89   CA 9.0 10.2*   MG 1.8  --    PHOS 5.9*  --    ALB  --  3.9   TBILI  --  0.9   ALT  --  18       Signed: Abhinav Rose MD

## 2021-12-28 NOTE — PROGRESS NOTES
End of Shift Note    Shift change report given to Nida Rodriguez RN (oncoming nurse) by Esther Miller RN (offgoing nurse). Report included the following information SBAR and Kardex    Shift worked:  1090-1471     Shift summary and any significant changes:     Patient had nuclear stress test and echo completed today. Upon return to unit, heparin drip discontinued per order and EKG obtained. Concerns for physician to address:  none     Zone phone for oncoming shift:   [phone]       Activity:  Activity Level: Up with Assistance    Cardiac:   Cardiac Monitoring: Yes      Cardiac Rhythm: Sinus Rhythm    Access:   Current line(s): PIV     Respiratory:   O2 Device: None (Room air)     GI:  Last Bowel Movement Date: 12/27/21  Current diet:  ADULT DIET Regular; Low Fat/Low Chol/High Fiber/2 gm Na     Pain Management:   Patient states pain is manageable on current regimen: YES    Skin:  Aiden Score: 21  Interventions: increase time out of bed    Patient Safety:  Fall Score:  Total Score: 4  Interventions: pt to call before getting OOB  High Fall Risk: Yes    Length of Stay:  Expected LOS: - - -  Actual LOS: 0       Esther Miller RN

## 2021-12-29 PROCEDURE — 74011250636 HC RX REV CODE- 250/636: Performed by: INTERNAL MEDICINE

## 2021-12-29 PROCEDURE — 97161 PT EVAL LOW COMPLEX 20 MIN: CPT

## 2021-12-29 PROCEDURE — 74011250637 HC RX REV CODE- 250/637: Performed by: NURSE PRACTITIONER

## 2021-12-29 PROCEDURE — 74011250637 HC RX REV CODE- 250/637: Performed by: INTERNAL MEDICINE

## 2021-12-29 PROCEDURE — 96375 TX/PRO/DX INJ NEW DRUG ADDON: CPT

## 2021-12-29 PROCEDURE — 97116 GAIT TRAINING THERAPY: CPT

## 2021-12-29 PROCEDURE — 99232 SBSQ HOSP IP/OBS MODERATE 35: CPT | Performed by: INTERNAL MEDICINE

## 2021-12-29 PROCEDURE — 74011000250 HC RX REV CODE- 250: Performed by: INTERNAL MEDICINE

## 2021-12-29 PROCEDURE — 97535 SELF CARE MNGMENT TRAINING: CPT

## 2021-12-29 PROCEDURE — G0378 HOSPITAL OBSERVATION PER HR: HCPCS

## 2021-12-29 PROCEDURE — 97165 OT EVAL LOW COMPLEX 30 MIN: CPT

## 2021-12-29 PROCEDURE — APPSS45 APP SPLIT SHARED TIME 31-45 MINUTES: Performed by: NURSE PRACTITIONER

## 2021-12-29 RX ORDER — ONDANSETRON 2 MG/ML
4 INJECTION INTRAMUSCULAR; INTRAVENOUS
Status: DISCONTINUED | OUTPATIENT
Start: 2021-12-29 | End: 2022-01-01 | Stop reason: HOSPADM

## 2021-12-29 RX ADMIN — Medication 10 ML: at 16:18

## 2021-12-29 RX ADMIN — METOPROLOL TARTRATE 25 MG: 25 TABLET, FILM COATED ORAL at 09:10

## 2021-12-29 RX ADMIN — CILOSTAZOL 50 MG: 100 TABLET ORAL at 09:08

## 2021-12-29 RX ADMIN — TRAZODONE HYDROCHLORIDE 50 MG: 50 TABLET ORAL at 21:48

## 2021-12-29 RX ADMIN — PANTOPRAZOLE SODIUM 40 MG: 40 TABLET, DELAYED RELEASE ORAL at 06:56

## 2021-12-29 RX ADMIN — ATORVASTATIN CALCIUM 40 MG: 40 TABLET, FILM COATED ORAL at 21:48

## 2021-12-29 RX ADMIN — LIDOCAINE HYDROCHLORIDE 40 ML: 20 SOLUTION ORAL; TOPICAL at 09:10

## 2021-12-29 RX ADMIN — MONTELUKAST 10 MG: 10 TABLET, FILM COATED ORAL at 09:09

## 2021-12-29 RX ADMIN — Medication 10 ML: at 05:42

## 2021-12-29 RX ADMIN — Medication 10 ML: at 21:49

## 2021-12-29 RX ADMIN — ASPIRIN 81 MG CHEWABLE TABLET 81 MG: 81 TABLET CHEWABLE at 09:10

## 2021-12-29 RX ADMIN — ONDANSETRON 4 MG: 2 INJECTION INTRAMUSCULAR; INTRAVENOUS at 19:44

## 2021-12-29 RX ADMIN — ALPRAZOLAM 1 MG: 0.5 TABLET ORAL at 19:44

## 2021-12-29 RX ADMIN — CITALOPRAM HYDROBROMIDE 40 MG: 20 TABLET ORAL at 09:00

## 2021-12-29 NOTE — PROGRESS NOTES
Problem: Falls - Risk of  Goal: *Absence of Falls  Description: Document Darlen Mansfield Fall Risk and appropriate interventions in the flowsheet.   Outcome: Progressing Towards Goal  Note: Fall Risk Interventions:  Mobility Interventions: Communicate number of staff needed for ambulation/transfer         Medication Interventions: Patient to call before getting OOB    Elimination Interventions: Call light in reach    History of Falls Interventions: Room close to nurse's station         Problem: Pain  Goal: *Control of Pain  Outcome: Progressing Towards Goal

## 2021-12-29 NOTE — PROGRESS NOTES
Problem: Mobility Impaired (Adult and Pediatric)  Goal: *Acute Goals and Plan of Care (Insert Text)  Description: FUNCTIONAL STATUS PRIOR TO ADMISSION: Reports independence w/ ambulation however endorses 1-2 recent falls. States she was previously ambulating with rolling walker support however her son \"taught her to walk without it. \" States daughter assists her with stair climbing. HOME SUPPORT PRIOR TO ADMISSION: The patient lived with daughter however states daughter works full-time. Physical Therapy Goals  Initiated 12/29/2021  1. Patient will move from supine to sit and sit to supine , scoot up and down, and roll side to side in bed with supervision/set-up within 7 day(s). 2.  Patient will transfer from bed to chair and chair to bed with supervision/set-up using the least restrictive device within 7 day(s). 3.  Patient will perform sit to stand with supervision/set-up within 7 day(s). 4.  Patient will ambulate with contact guard assist for 75 feet with the least restrictive device within 7 day(s). 5.  Patient will ascend/descend 4 stairs with 1 handrail(s) with minimal assistance/contact guard assist within 7 day(s). Outcome: Progressing Towards Goal   PHYSICAL THERAPY EVALUATION  Patient: Noella Aase (15 y.o. female)  Date: 12/29/2021  Primary Diagnosis: Chest pain [R07.9]        Precautions:        ASSESSMENT  Based on the objective data described below, the patient presents with orthostatic hypotension, increased abdominal pain, increased anxiety, generalized weakness, impaired activity tolerance, impaired balance, and overall impaired functional mobility. BP decreased to 75/52 upon pt assuming seated position EOB however stabilized with exercise/activity. Pt only able to tolerate mobilization from EOB<>bedside commode secondary to increased abdominal pain and generally feeling poorly. Pt is functioning well below her baseline independent level and at increased risk for falls. Pending further progress with therapy, recommend HHPT w/ 24hr assist vs SNF at discharge. Current Level of Function Impacting Discharge (mobility/balance): CGAx1    Functional Outcome Measure: The patient scored 40/100 on the Barthel Index outcome measure which is indicative of moderate impairment in ADLs and functional mobility. Other factors to consider for discharge: falls risk, pain levels, orthostatic hypotension     Patient will benefit from skilled therapy intervention to address the above noted impairments. PLAN :  Recommendations and Planned Interventions: bed mobility training, transfer training, gait training, therapeutic exercises, patient and family training/education, and therapeutic activities      Frequency/Duration: Patient will be followed by physical therapy:  4 times a week to address goals. Recommendation for discharge: (in order for the patient to meet his/her long term goals)  To be determined: pending further progress with therapy, recommend HHPT w/ 24hr physical assist vs SNF    This discharge recommendation:  Has not yet been discussed the attending provider and/or case management    IF patient discharges home will need the following DME: patient owns DME required for discharge         SUBJECTIVE:   Patient stated I was in [place] for my birthday, I walked through the airport.     OBJECTIVE DATA SUMMARY:   HISTORY:    Past Medical History:   Diagnosis Date    Anxiety     CAD (coronary artery disease) 10/11/2012    Cancer ([address]     lung    Cancer ([address]     lung CA    Chronic obstructive pulmonary disease (HCC)     Chronic pain     legs and lower back    COPD     Depression     Dyslipidemia 10/11/2012    Endocrine disease     Gastrointestinal disorder     gastric ulcers    GERD (gastroesophageal reflux disease)     Headache(784.0)     Hematuria     Hypertension     Lung cancer ([address]     Malignant neoplasm of bronchus ([address]     Meningitis 2010    Psychiatric disorder     depression    Psychiatric disorder     depression, anxiety    PUD (peptic ulcer disease)      Past Surgical History:   Procedure Laterality Date    DELIVERY       HX BREAST BIOPSY Left     neg path    HX GI      reflux surgery    HX GYN       3 c-sections    HX HYSTERECTOMY      HX OTHER SURGICAL      lobectomy    HX UROLOGICAL      stone rmvl with lithotripsy    MD CARDIAC SURG PROCEDURE UNLIST      SHARON placed 10- by Dr. Berna Zheng      cardiac stents    Aruna Arceo      left upper lobe ectomy secondary to carcinoma    MD REMOVE LUNG/CHEST WALL      VASCULAR SURGERY PROCEDURE UNLIST      fem-fem bypass    VASCULAR SURGERY PROCEDURE UNLIST      leg stents       Personal factors and/or comorbidities impacting plan of care:     Home Situation  Home Environment: Private residence  # Steps to Enter: 4  Rails to Enter: Yes  One/Two Story Residence: Two story, live on 1st floor  Living Alone: No  Support Systems: Child(john)  Patient Expects to be Discharged to[de-identified] [employer]  Current DME Used/Available at Home: Shower chair,Cane, straight,Walker, rolling  Tub or Shower Type: Tub/Shower combination    EXAMINATION/PRESENTATION/DECISION MAKING:   Critical Behavior:     Orientation Level: Oriented X4  Cognition: Appropriate decision making,Appropriate for age attention/concentration,Follows commands     Hearing:     Skin:  intact  Edema: none noted   Range Of Motion:  AROM: Generally decreased, functional                       Strength:    Strength: Generally decreased, functional                    Tone & Sensation:   Tone: Normal              Sensation: Intact               Coordination:  Coordination: Within functional limits  Vision:      Functional Mobility:  Bed Mobility:     Supine to Sit: Contact guard assistance  Sit to Supine: Stand-by assistance  Scooting: Stand-by assistance  Transfers:  Sit to Stand: Contact guard assistance  Stand to Sit: Contact guard assistance                       Balance:   Sitting: Intact  Standing: Impaired  Standing - Static: Good  Standing - Dynamic : Fair  Ambulation/Gait Training:  Distance (ft): 3 Feet (ft)  Assistive Device: Gait belt; Other (comment) (HHAx1)  Ambulation - Level of Assistance: Contact guard assistance        Gait Abnormalities: Decreased step clearance        Base of Support: Narrowed     Speed/Viktoria: Slow;Shuffled  Step Length: Right shortened;Left shortened               Functional Measure:  Barthel Index:    Bathin  Bladder: 5  Bowels: 5  Groomin  Dressin  Feedin  Mobility: 0  Stairs: 0  Toilet Use: 5  Transfer (Bed to Chair and Back): 10  Total: 40/100       The Barthel ADL Index: Guidelines  1. The index should be used as a record of what a patient does, not as a record of what a patient could do. 2. The main aim is to establish degree of independence from any help, physical or verbal, however minor and for whatever reason. 3. The need for supervision renders the patient not independent. 4. A patient's performance should be established using the best available evidence. Asking the patient, friends/relatives and nurses are the usual sources, but direct observation and common sense are also important. However direct testing is not needed. 5. Usually the patient's performance over the preceding 24-48 hours is important, but occasionally longer periods will be relevant. 6. Middle categories imply that the patient supplies over 50 per cent of the effort. 7. Use of aids to be independent is allowed. Score Interpretation (from [address] [address])    Independent   60-79 Minimally independent   40-59 Partially dependent   20-39 Very dependent   <20 Totally dependent     -Trenton Cheng, Barthel, D.W. (1965). Functional evaluation: the Barthel Index. [facility] ([address] [facility] [facility] [address] (1997).  The Barthel activities of daily living index: self-reporting versus actual performance in the old (> or = 75 years). Journal of [address] [place] [address] 45(1), [facility], J.LOLLY.F, Judy Hess., Afia London. (1999). Measuring the change in disability after inpatient rehabilitation; comparison of the responsiveness of the Barthel Index and Functional [place] Measure. Journal of Neurology, Neurosurgery, and Psychiatry, 66(4), 146-510. CHAPARRITA Burnett, TOMMIE Mendoza, & Pradeep Hernandez M.A. (2004) Assessment of post-stroke quality of life in cost-effectiveness studies: The usefulness of the Barthel Index and the EuroQoL-5D. Quality of Life Research, 15, 266-39           Physical Therapy Evaluation Charge Determination   History Examination Presentation Decision-Making   MEDIUM  Complexity : 1-2 comorbidities / personal factors will impact the outcome/ POC  MEDIUM Complexity : 3 Standardized tests and measures addressing body structure, function, activity limitation and / or participation in recreation  MEDIUM Complexity : Evolving with changing characteristics  MEDIUM Complexity : FOTO score of 26-74      Based on the above components, the patient evaluation is determined to be of the following complexity level: MEDIUM    Pain Rating:  Reported pain in abdomen however did not rate    Activity Tolerance:   signs and symptoms of orthostatic hypotension    After treatment patient left in no apparent distress:   Supine in bed, Call bell within reach, and Side rails x 3    COMMUNICATION/EDUCATION:   The patients plan of care was discussed with: Occupational therapist and Registered nurse. Fall prevention education was provided and the patient/caregiver indicated understanding., Patient/family have participated as able in goal setting and plan of care. , and Patient/family agree to work toward stated goals and plan of care.     Thank you for this referral.  Bia Lara, PT, DPT   Time Calculation: 24 mins

## 2021-12-29 NOTE — PROGRESS NOTES
[address] [place] [address], [address] [facility]  [phone]      Cardiology Progress Note      12/29/2021 150PM    Admit Date: 12/27/2021    Admit Diagnosis:   Chest pain [R07.9]    Interval History/Subjective:     Valentina Schmidt is a 80 y.o. female with PMH CAD, HLD, COPD, lung cancer s/p lung resection, PVD, orthostatic hypotension who presented to the ED with chest pain. -VSS  -no labs  -Ms. Hatchett is c/o continued abd pain. She had some chest pain earlier, but none recently. Chest wall . States her BMs are black.       Visit Vitals  /60   Pulse 63   Temp 98.3 °F (36.8 °C)   Resp 18   Ht 4' 11\" (1.499 m)   Wt 50 kg (110 lb 3.7 oz)   SpO2 100%   BMI 22.26 kg/m²       Current Facility-Administered Medications   Medication Dose Route Frequency    sodium chloride (NS) flush 5-40 mL  5-40 mL IntraVENous Q8H    sodium chloride (NS) flush 5-40 mL  5-40 mL IntraVENous PRN    nitroglycerin (NITROSTAT) tablet 0.4 mg  0.4 mg SubLINGual Q5MIN PRN    metoprolol tartrate (LOPRESSOR) tablet 25 mg  25 mg Oral Q12H    aspirin chewable tablet 81 mg  81 mg Oral DAILY    acetaminophen (TYLENOL) tablet 650 mg  650 mg Oral Q6H PRN    ALPRAZolam (XANAX) tablet 1 mg  1 mg Oral QPM    cilostazoL (PLETAL) tablet 50 mg  50 mg Oral DAILY    citalopram (CELEXA) tablet 40 mg  40 mg Oral DAILY    montelukast (SINGULAIR) tablet 10 mg  10 mg Oral DAILY    pantoprazole (PROTONIX) tablet 40 mg  40 mg Oral ACB    traZODone (DESYREL) tablet 50 mg  50 mg Oral QHS    atorvastatin (LIPITOR) tablet 40 mg  40 mg Oral QHS     Facility-Administered Medications Ordered in Other Encounters   Medication Dose Route Frequency    sodium chloride (NS) flush 10 mL  10 mL IntraVENous PRN       Objective:      Physical Exam:  General Appearance:  awake, cooperative, thin, elderly AAF resting in bed in NAD; appears stated age  Eyes: sclera anicteric  Mouth/Throat: moist mucous membranes; oral pharynx clear  Neck: supple;   Pulmonary:  clear to auscultation bilaterally; good effort  Cardiovascular: regular rate and rhythm; no murmur, click, rub, or gallop  Abdomen: soft, non-distended; bowel sounds normal  Musculoskeletal: no swelling or deformity; moves all extremities  Extremities: no edema; palpable distal pulses   Skin: warm and dry  Neuro: grossly normal.  Occasionally slow to respond  Psych: flat          Data Review:   Recent Labs     12/28/21  0326 12/27/21 2025 12/27/21  1639   WBC 7.5 7.8 6.2   HGB 11.6 12.5 12.6   HCT 34.9* 36.8 37.2    245 246     Recent Labs     12/28/21  0326 12/27/21  1517    140   K 3.6 3.3*   * 109*   CO2 23 23   * 97   BUN 8 8   CREA 0.87 0.89   CA 9.0 10.2*   MG 1.8  --    PHOS 5.9*  --    ALB  --  3.9   TBILI  --  0.9   ALT  --  18       No results for input(s): TROIQ, CPK, CKMB in the last 72 hours. Intake/Output Summary (Last 24 hours) at 12/29/2021 1421  Last data filed at 12/28/2021 2345  Gross per 24 hour   Intake 102.35 ml   Output --   Net 102.35 ml        Telemetry: SR to SB. occ PVC  ECG: SR with ST and T wave changes inferolateral slightly worse than 07/21   Echocardiogram: EF 62-67%; diastolic dysfunction   CXRAY: \"Hazy opacification of the left mid and lower lung, consistent with  Pneumonia. \"    Assessment:     Active Problems:    Atypical chest pain (12/27/2021)      Primary hypertension (12/27/2021)      Chest pain (12/27/2021)        Plan:     Atypical chest pain:  Intermittent. hypertensive urgency vs musculoskeletal pain. troponins low.   Ischemic changes on EKG  · Stress test with no ischemia  · Repeat EKG shows improvement   · ECHO with EF 55-60% and no new concerns   · ASA, statin, BB with CAD history  · Like musculoskeletal pain and/or hypertensive urgency     Hypertensive urgency:  BP now normal.  History of orthostatic hypotension in the past  · Continue BB    Abdominal pain:  Nothing significant on US   · Per hospitalist  · Patient states her stool was black today. Will order an occult stool. If abnormal, will defer to hospitalist      No further cardiac work up. Will see again if asked. Please call with questions or concerns. Follow up in office with Dr. José Nichole. Lila Quintana NP  DNP, RN, [facility]      Patient seen and examined by me with nurse practitioner. I personally performed all components of the history, physical, and medical decision making and agree with the assessment and plan as noted. Stress test and Echo noted. Continue current meds. Will follow as needed please call if can be of any further assistance.      Thang Decker MD

## 2021-12-29 NOTE — PROGRESS NOTES
Transition of Care Plan:    RUR: n/a - obs   Disposition: Home with family assistance   Follow up appointments: PCP and specialist as indicated   DME needed: TBD   Transportation at Discharge: Dtr to transport at [facility] or means to access home: Dtr has  IM Medicare Letter: n/a - unless upgraded to inpatient    Is patient a BCPI-A Bundle: No   If yes, was Bundle Letter given?:    Is patient a  and connected with the [place]? No  If yes, was [employer] transfer form completed and [facility] notified? Caregiver Contact: Dtr - Di Hathaway - [phone]  Discharge Caregiver contacted prior to discharge? To be contacted prior to dc              Medicare Outpatient Observation Notice (MOON) provided to patient/representative with verbal explanation of the notice. Time allotted for questions regarding the notice. Patient /representative provided a completed copy of the MOON notice. Copy placed on bedside chart. Advance Care Planning     General Advance Care Planning (ACP) Conversation    Date of Conversation: 12/29/2021  Conducted with: Patient with Decision Making Capacity    Healthcare Decision Maker:   No healthcare decision makers have been documented. Click here to complete [address] including selection of the Healthcare Decision Maker Relationship (ie \"Primary\")    Today we documented Decision Maker(s) consistent with Legal Next of Kin hierarchy. Content/Action Overview: Has ACP document(s) on file - reflects the patient's care preferences  Reviewed DNR/DNI and patient elects Full Code (Attempt Resuscitation)    Length of Voluntary ACP Conversation in minutes:  <16 minutes (Non-Billable)    Starr Workman     Chart reviewed. CM met with pt at bedside - pt requested that assessment be completed by phone with dtr Maria Del Carmen Jo [phone]. Verified contact information, demographics and insurance information. Prior to admission, pt resides with dtr in a 2 level home with [facility].  At baseline, pt is mostly independent with ADLs/IADLs. Pt's dtr to transport at [place]. Pt owns a rollator. Preferred pharmacy is [facility] on [facility] [address]. Family is agreeable to the discharge plan and voiced no further questions/concerns regarding discharge at this time. CM will continue to follow for d/c planning. Care Management Interventions  PCP Verified by CM: Yes  Palliative Care Criteria Met (RRAT>21 & CHF Dx)?: No  Mode of Transport at Discharge:  Other (see comment) (Dtr to transport at [facility]. )  Transition of Care Consult (CM Consult): Discharge Planning  Discharge Durable Medical Equipment: No  Physical Therapy Consult: No  Occupational Therapy Consult: No  Speech Therapy Consult: No  Support Systems: Child(john)  Confirm Follow Up Transport: Family  The Patient and/or Patient Representative was Provided with a Choice of Provider and Agrees with the Discharge Plan?: Yes  Freedom of Choice List was Provided with Basic Dialogue that Supports the Patient's Individualized Plan of Care/Goals, Treatment Preferences and Shares the Quality Data Associated with the Providers?: No  [place] Resource Information Provided?: No  Discharge Location  Discharge Placement: Home with family assistance    Mohini De La Cruz, [address] [place] [address], [facility]  [phone]

## 2021-12-29 NOTE — PROGRESS NOTES
Hospitalist Progress Note    NAME: Davidson Denson   :  1940   MRN:  [id number]       Assessment / Plan:    Chest pain  Cardiology consulted-appreciate input. Troponin trended negative  Echo showed nl EF. Stress test negative   heparin gtt discontinued  ASA, metoprolol 25 mg PO Q12  CXR showed Hazy opacification of the left mid and lower lung, consistent with  Pneumonia. Procalcitonin <0.05 so no need for the antibiotics. Febrile. C/w Pletal  High intensity statin  EKG with inferolateral ST/T wave changes   Pt is still having chest/abdpain. Given morphine which helped overnight. Will add Gi cocktail. Add PPI  PT/OT     Dementia? Delirium precautions     HTN  C/w BB     COPD  Stable, no home Rx?     PUD  C/w PPI    18.5 - 24.9 Normal weight / Body mass index is 22.26 kg/m². Estimated discharge date: -  Barriers: clinical improvement    Code status: Full  Prophylaxis: Hep SQ  Recommended Disposition: Home w/Family     Subjective:     Chief Complaint / Reason for Physician Visit  Patient seen today, appears weak. She has significant abd pain this AM.  Had CP overnight required IV morphine. Discussed with RN events overnight. Objective:     VITALS:   Last 24hrs VS reviewed since prior progress note.  Most recent are:  Patient Vitals for the past 24 hrs:   Temp Pulse Resp BP SpO2   21 0821 97.9 °F (36.6 °C) 60 18 (!) 109/56 99 %   21 0306 97.7 °F (36.5 °C) (!) 52 18 (!) 133/56 99 %   21 2321 98.3 °F (36.8 °C) 76 17 118/60 97 %   21 2004 98.2 °F (36.8 °C) 73 16 125/63 98 %   21 1611 98.5 °F (36.9 °C) 68 15 (!) 122/50 100 %   21 1440 98 °F (36.7 °C) 60 16 (!) 123/58 100 %   21 1124 -- -- -- 125/61 --       Intake/Output Summary (Last 24 hours) at 2021 3929  Last data filed at 2021 2345  Gross per 24 hour   Intake 102.35 ml   Output --   Net 102.35 ml        I had a face to face encounter and independently examined this patient on 12/29/2021, as outlined below:  PHYSICAL EXAM:  General: WD, WN. Alert, cooperative, no acute distress    EENT:  EOMI. Anicteric sclerae. MMM  Resp:  CTA bilaterally, no wheezing or rales. No accessory muscle use  CV:  Regular  rhythm,  No edema  GI:  Soft, Non distended, Non tender. +Bowel sounds  Neurologic:  Alert and oriented X 3, normal speech,   Psych:   Good insight. Not anxious nor agitated  Skin:  No rashes. No jaundice    Reviewed most current lab test results and cultures  YES  Reviewed most current radiology test results   YES  Review and summation of old records today    NO  Reviewed patient's current orders and MAR    YES  PMH/SH reviewed - no change compared to H&P  ________________________________________________________________________  Care Plan discussed with:    Comments   Patient X    Family      RN X    Care Manager     Consultant                        Multidiciplinary team rounds were held today with , nursing, pharmacist and clinical coordinator. Patient's plan of care was discussed; medications were reviewed and discharge planning was addressed. ________________________________________________________________________  Total NON critical care TIME:  39   Minutes    Total CRITICAL CARE TIME Spent:   Minutes non procedure based      Comments   >50% of visit spent in counseling and coordination of care     ________________________________________________________________________  Iván Booth MD     Procedures: see electronic medical records for all procedures/Xrays and details which were not copied into this note but were reviewed prior to creation of Plan. LABS:  I reviewed today's most current labs and imaging studies.   Pertinent labs include:  Recent Labs     12/28/21  0326 12/27/21 2025 12/27/21  1639   WBC 7.5 7.8 6.2   HGB 11.6 12.5 12.6   HCT 34.9* 36.8 37.2    245 246     Recent Labs     12/28/21 0326 12/27/21  1517    140   K 3.6 3.3* * 109*   CO2 23 23   * 97   BUN 8 8   CREA 0.87 0.89   CA 9.0 10.2*   MG 1.8  --    PHOS 5.9*  --    ALB  --  3.9   TBILI  --  0.9   ALT  --  18       Signed: Melvi Giles MD

## 2021-12-29 NOTE — PROGRESS NOTES
Oral and Written notification given to patient and/or caregiver informing patient of current Observation status receiving care in our facility. Copied placed on bedside chart. Medicare Outpatient Observation Notice (MOON) provided to patient/representative with verbal explanation of the notice. Time allotted for questions regarding the notice. Patient /representative provided a completed copy of the BOYKIN notice. Signed copy placed on bedside chart.     Regan Hernandez, Care Management Assistant

## 2021-12-29 NOTE — PROGRESS NOTES
Problem: Self Care Deficits Care Plan (Adult)  Goal: *Acute Goals and Plan of Care (Insert Text)  Description: FUNCTIONAL STATUS PRIOR TO ADMISSION: Patient was independent and active without use of DME. Recent trip to [place] to see son who she reports encouraged her to ambulate without AD so she has not been using cane or walker. Chest pain started on airplane back to [place]. She sponge baths at home. HOME SUPPORT: The patient lived alone with daughter to provide assistance. Occupational Therapy Goals  Initiated 12/29/2021  1. Patient will perform bathing with supervision/set-up within 7 day(s). 2.  Patient will perform lower body dressing with supervision/set-up within 7 day(s). 3.  Patient will perform toilet transfers with supervision/set-up within 7 day(s). 4.  Patient will perform all aspects of toileting with supervision/set-up within 7 day(s). 5.  Patient will participate in upper extremity therapeutic exercise/activities with supervision/set-up for 8 minutes within 7 day(s). 6.  Patient will utilize energy conservation techniques during functional activities with verbal cues within 7 day(s). Outcome: Progressing Towards Goal   OCCUPATIONAL THERAPY EVALUATION  Patient: Giorgi Choi (35 y.o. female)  Date: 12/29/2021  Primary Diagnosis: Chest pain [R07.9]       Precautions: Fall       ASSESSMENT  Based on the objective data described below, the patient presents with impaired activity tolerance with orthostatic hypotension, weakness, balance, mobility and upper abdominal pain impacting her ability to complete ADL tasks. Nursing cleared for therapy services. She was admitted on 12/27 which she reports due to chest pain while on airplane flying back from [place] to visit her son. Expresses need for toileting. Supine > sit with   CGA with decrease in BP that resolved with BUE/BLE AROM. SPT to [facility] with CGA and vc with reports of abd pain. She did not void on BSC.   Completed hygiene with SBA and returned to bed with CGA. Patient Vitals for the past 8 hrs:   Temp Pulse Resp BP SpO2           Sitting on BSC -- 67 -- (!) 103/52 --   Sitting EOB s/p BUE/BLE AROM -- 65 -- (!) 111/56 --   Seated EOB -- 68 -- (!) 75/52 --   Supine pre actvity -- 62 -- (!) 102/50 --                           Current Level of Function Impacting Discharge (ADLs/self-care): toileting hygiene CGA, clothing mgmt CGA    Functional Outcome Measure: The patient scored 40/100 on the Barthel Index outcome measure which is indicative of moderate impairment with ADL tasks. Other factors to consider for discharge: Patient is below baseline. Recommend return home with 24 hour supervision and assist vs SNF pending progression. Patient will benefit from skilled therapy intervention to address the above noted impairments. PLAN :  Recommendations and Planned Interventions: self care training, functional mobility training, therapeutic exercise, balance training, therapeutic activities, endurance activities, patient education, home safety training, and family training/education    Frequency/Duration: Patient will be followed by occupational therapy 4 times a week to address goals. Recommendation for discharge: (in order for the patient to meet his/her long term goals)  To be determined: HH with 24/ assistance vs SNF    This discharge recommendation:  Has not yet been discussed the attending provider and/or case management    IF patient discharges home will need the following DME: patient owns DME required for discharge       SUBJECTIVE:   Patient stated My stools have been black.     OBJECTIVE DATA SUMMARY:   HISTORY:   Past Medical History:   Diagnosis Date    Anxiety     CAD (coronary artery disease) 10/11/2012    Cancer ([facility]     lung    Cancer ([facility]     lung CA    Chronic obstructive pulmonary disease (HCC)     Chronic pain     legs and lower back    COPD     Depression     Dyslipidemia 10/11/2012    Endocrine disease     Gastrointestinal disorder     gastric ulcers    GERD (gastroesophageal reflux disease)     Headache(784.0)     Hematuria     Hypertension     [facility]     Malignant neoplasm of bronchus [facility]     Meningitis 2010    Psychiatric disorder     depression    Psychiatric disorder     depression, anxiety    PUD (peptic ulcer disease)      Past Surgical History:   Procedure Laterality Date    DELIVERY       HX BREAST BIOPSY Left     neg path    HX GI      reflux surgery    HX GYN       3 c-sections    HX HYSTERECTOMY      HX OTHER SURGICAL      lobectomy    HX UROLOGICAL      stone rmvl with lithotripsy    WA CARDIAC SURG PROCEDURE UNLIST      SHARON placed 10- by Dr. Marisa Helmspe      cardiac stents    Zhen Null      left upper lobe ectomy secondary to carcinoma    WA REMOVE LUNG/CHEST WALL      VASCULAR SURGERY PROCEDURE UNLIST      fem-fem bypass    VASCULAR SURGERY PROCEDURE UNLIST      leg stents       Expanded or extensive additional review of patient history:     Home Situation  Home Environment: Private residence  # Steps to Enter: 4  Rails to Enter: Yes  One/Two Story Residence: Two story, live on 1st floor  Living Alone: No  Support Systems: Child(john)  Patient Expects to be Discharged to[de-identified] [employer]  Current DME Used/Available at Home: Shower chair,Cane, straight,Walker, rolling  Tub or Shower Type: Tub/Shower combination    Hand dominance: Right    EXAMINATION OF PERFORMANCE DEFICITS:  Cognitive/Behavioral Status:                                Range of Motion:  AROM: Generally decreased, functional   Limited B overhead shoulder flexion                      Strength:  Strength: Generally decreased, functional                Coordination:  Coordination: Within functional limits  Fine Motor Skills-Upper: Left Intact; Right Intact         Tone & Sensation:  Tone: Normal  Sensation: Intact Balance:  Sitting: Intact  Standing: Impaired  Standing - Static: Good  Standing - Dynamic : Fair    Functional Mobility and Transfers for ADLs:  Bed Mobility:  Supine to Sit: Contact guard assistance  Sit to Supine: Stand-by assistance  Scooting: Stand-by assistance    Transfers:  Sit to Stand: Contact guard assistance  Stand to Sit: Contact guard assistance  Bathroom Mobility: Contact guard assistance (to [facility] secondary to hypotension)  Toilet Transfer : Contact guard assistance    ADL Assessment:  Feeding: Independent    Oral Facial Hygiene/Grooming: Supervision;Setup    Bathing: Moderate assistance    Upper Body Dressing: Setup    Lower Body Dressing: Moderate assistance    Toileting: Moderate assistance                ADL Intervention and task modifications:  Patient instructed and indicated understanding the benefits of maintaining activity tolerance, functional mobility, and independence with self care tasks during acute stay  to ensure safe return home and to baseline. Encouraged patient to increase frequency and duration OOB, be out of bed for all meals, perform daily ADLs (as approved by RN/MD regarding bathing etc), and performing functional mobility to/from bathroom. Provided education with patient on fall prevention for hospital and at home. This includes not getting OOB/chair/toilet without staff assistance, good lighting, good footwear, and recommended AD use. Patient with fair understanding. Functional Measure:    Barthel Index:  Bathin  Bladder: 5  Bowels: 5  Groomin  Dressin  Feedin  Mobility: 0  Stairs: 0  Toilet Use: 5  Transfer (Bed to Chair and Back): 10  Total: 40/100      The Barthel ADL Index: Guidelines  1. The index should be used as a record of what a patient does, not as a record of what a patient could do. 2. The main aim is to establish degree of independence from any help, physical or verbal, however minor and for whatever reason.   3. The need for supervision renders the patient not independent. 4. A patient's performance should be established using the best available evidence. Asking the patient, friends/relatives and nurses are the usual sources, but direct observation and common sense are also important. However direct testing is not needed. 5. Usually the patient's performance over the preceding 24-48 hours is important, but occasionally longer periods will be relevant. 6. Middle categories imply that the patient supplies over 50 per cent of the effort. 7. Use of aids to be independent is allowed. Score Interpretation (from [address] [address])    Independent   60-79 Minimally independent   40-59 Partially dependent   20-39 Very dependent   <20 Totally dependent     -Elaina Cheng., Barthel, DStevenW. (1965). Functional evaluation: the Barthel Index. [address] [facility] ([address] [facility] [address] (1997). The Barthel activities of daily living index: self-reporting versus actual performance in the old (> or = 75 years). Journal of [address] [place] [address] 45(7), [facility], J.J.M.F, Shar Astudillo., [facility]. (1999). Measuring the change in disability after inpatient rehabilitation; comparison of the responsiveness of the Barthel Index and Functional [place] Measure. Journal of Neurology, Neurosurgery, and Psychiatry, 66(4), 173-244. Tien Benites, N.J.A, TOMMIE Mendoza, & Kacie Jovel, M.A. (2004) Assessment of post-stroke quality of life in cost-effectiveness studies: The usefulness of the Barthel Index and the EuroQoL-5D.  Quality of Life Research, 15, 306-33         Occupational Therapy Evaluation Charge Determination   History Examination Decision-Making   LOW Complexity : Brief history review  LOW Complexity : 1-3 performance deficits relating to physical, cognitive , or psychosocial skils that result in activity limitations and / or participation restrictions  LOW Complexity : No comorbidities that affect functional and no verbal or physical assistance needed to complete eval tasks       Based on the above components, the patient evaluation is determined to be of the following complexity level: LOW   Pain Rating:  Abd pain, did not rate    Activity Tolerance:   Poor- orthostatic, abd pain, and fatigue    After treatment patient left in no apparent distress:    Supine in bed and Call bell within reach    COMMUNICATION/EDUCATION:   The patients plan of care was discussed with: Physical therapist and Registered nurse. Home safety education was provided and the patient/caregiver indicated understanding. and Patient/family agree to work toward stated goals and plan of care. This patients plan of care is appropriate for delegation to hospitals.     Thank you for this referral.  Agata Erazo OT  Time Calculation: 25 mins

## 2021-12-29 NOTE — PROGRESS NOTES
80 y.o. female admitted for atypical chest pain. A&OX4, steady gait, up ad paramjit, tele - NSR, + pulses, no edema, clear lung sounds on RA. Multiples tests performed today, pending results. 1730 - Pt complaining of mid chest pain that radiates down to her stomach and a headache. PRN tylenol given. RN reached out to MD via [facility] for something additional for pain. 1745 - MD stated pt could have a one time dose of 1 mg morphine if needed. No order placed. Pt encouraged to rest and see if tylenol kicked in.    2110 - Pt still complaining of 9/10 pain. Perfect serve sent to NP which stated that RN could place an order for the morphine under physicians name. 1 mg morphine one time dose was ordered by RN. 0'\"   12/28/21 5:45 PM   [phone] Hospital or Facility: [facility] From: [facility] RE: Ildefonso Santana 1940 RM: [id number] Pt is complaining of 8/10 chest, abdominal and head pain. I am administering PRN Tylenol now. She is asking for something stronger for pain. Thanks. Need Callback: NO CALLBACK RE FLOAT POOL  Read 5:54 PM   0'\"   12/28/21 5:54 PM   We can give morphine 1 mg   0'\"   12/28/21 5:54 PM   One time dose       2210 - 1 mg IV morphine was administered for pain.

## 2021-12-30 PROCEDURE — 96375 TX/PRO/DX INJ NEW DRUG ADDON: CPT

## 2021-12-30 PROCEDURE — 74011250637 HC RX REV CODE- 250/637: Performed by: INTERNAL MEDICINE

## 2021-12-30 PROCEDURE — G0378 HOSPITAL OBSERVATION PER HR: HCPCS

## 2021-12-30 PROCEDURE — 74011250636 HC RX REV CODE- 250/636: Performed by: INTERNAL MEDICINE

## 2021-12-30 PROCEDURE — 74011250637 HC RX REV CODE- 250/637: Performed by: NURSE PRACTITIONER

## 2021-12-30 RX ORDER — PANTOPRAZOLE SODIUM 40 MG/1
40 TABLET, DELAYED RELEASE ORAL 2 TIMES DAILY
Status: DISCONTINUED | OUTPATIENT
Start: 2021-12-30 | End: 2022-01-01 | Stop reason: HOSPADM

## 2021-12-30 RX ORDER — SODIUM CHLORIDE 9 MG/ML
100 INJECTION, SOLUTION INTRAVENOUS CONTINUOUS
Status: DISPENSED | OUTPATIENT
Start: 2021-12-30 | End: 2021-12-30

## 2021-12-30 RX ORDER — MORPHINE SULFATE 2 MG/ML
1 INJECTION, SOLUTION INTRAMUSCULAR; INTRAVENOUS ONCE
Status: COMPLETED | OUTPATIENT
Start: 2021-12-30 | End: 2021-12-30

## 2021-12-30 RX ADMIN — PANTOPRAZOLE SODIUM 40 MG: 40 TABLET, DELAYED RELEASE ORAL at 18:34

## 2021-12-30 RX ADMIN — METOPROLOL TARTRATE 25 MG: 25 TABLET, FILM COATED ORAL at 08:45

## 2021-12-30 RX ADMIN — METOPROLOL TARTRATE 25 MG: 25 TABLET, FILM COATED ORAL at 22:08

## 2021-12-30 RX ADMIN — ATORVASTATIN CALCIUM 40 MG: 40 TABLET, FILM COATED ORAL at 22:08

## 2021-12-30 RX ADMIN — MORPHINE SULFATE 1 MG: 2 INJECTION, SOLUTION INTRAMUSCULAR; INTRAVENOUS at 15:58

## 2021-12-30 RX ADMIN — Medication 10 ML: at 06:28

## 2021-12-30 RX ADMIN — ALPRAZOLAM 1 MG: 0.5 TABLET ORAL at 18:34

## 2021-12-30 RX ADMIN — TRAZODONE HYDROCHLORIDE 50 MG: 50 TABLET ORAL at 22:08

## 2021-12-30 RX ADMIN — CILOSTAZOL 50 MG: 100 TABLET ORAL at 08:45

## 2021-12-30 RX ADMIN — ONDANSETRON 4 MG: 2 INJECTION INTRAMUSCULAR; INTRAVENOUS at 08:50

## 2021-12-30 RX ADMIN — SODIUM CHLORIDE 100 ML/HR: 9 INJECTION, SOLUTION INTRAVENOUS at 08:46

## 2021-12-30 RX ADMIN — CITALOPRAM HYDROBROMIDE 40 MG: 20 TABLET ORAL at 08:46

## 2021-12-30 RX ADMIN — MONTELUKAST 10 MG: 10 TABLET, FILM COATED ORAL at 08:45

## 2021-12-30 RX ADMIN — ASPIRIN 81 MG CHEWABLE TABLET 81 MG: 81 TABLET CHEWABLE at 08:44

## 2021-12-30 RX ADMIN — Medication 10 ML: at 22:09

## 2021-12-30 RX ADMIN — PANTOPRAZOLE SODIUM 40 MG: 40 TABLET, DELAYED RELEASE ORAL at 08:45

## 2021-12-30 RX ADMIN — Medication 10 ML: at 15:58

## 2021-12-30 NOTE — PROGRESS NOTES
Occupational Therapy    Patient chart reviewed up to date. Attempted  Visit, however patient politely requesting therapy return at later time. Patient reports sitting EOB for breakfast, however now with stomach pain and desire to rest in supine for brief period. Will follow-up as able and appropriate. Thank you.   Kendal Cuenca, OTR/L English

## 2021-12-30 NOTE — PROGRESS NOTES
Bedside shift change report given to Niru Badillo, (oncoming nurse) by Lisa Galicia RN (offgoing nurse).   Report included the following information SBAR, Kardex, Intake/Output and MAR

## 2021-12-30 NOTE — PROGRESS NOTES
Transition of Care Plan:     RUR: n/a - obs   Disposition: Home with HHPT - referrals need to be sent   Follow up appointments: PCP and specialist as indicated   DME needed: TBD   Transportation at Discharge: Dtr to transport at [place] or means to access home: Dtr has  IM Medicare Letter: n/a - unless upgraded to inpatient    Is patient a BCPI-A Bundle: No              If yes, was Bundle Letter given?:    Is patient a  and connected with the [place]? No  If yes, was [employer] transfer form completed and VA notified? Caregiver Contact: Dtr - Chen Ramirez - [phone]  Discharge Caregiver contacted prior to discharge? To be contacted prior to dc            Initial Note: CM phoned dtr Danielle Padgett [phone] re discharge dispo. Family not agreeable to SNF, but are agreeable to [facility]. Unit CM please initiate [place] referrals - FOC provided, family open to all agencies. NADEGE is 12/31.      Shelton Michele MSW  Care Manager, [facility]

## 2021-12-30 NOTE — PROGRESS NOTES
Hospitalist Progress Note    NAME: Peyton Capellan   :  1940   MRN:  [id number]       Assessment / Plan:    Chest pain  Cardiology consulted-appreciate input. Troponin trended less than reference range  Echo showed nl EF. Stress test negative   heparin gtt discontinued  ASA, metoprolol 25 mg PO Q12  CXR showed Hazy opacification of the left mid and lower lung, consistent with  Pneumonia. Procalcitonin <0.05 so no need for the antibiotics. Febrile. C/w Pletal  High intensity statin  EKG with inferolateral ST/T wave changes   Pt is still having chest/abdpain. Given morphine which helped overnight. Continue PPI increased dose to twice daily  PT/OT. If continue to have abdominal pain will consult GI for EGD     Dementia? Delirium precautions     HTN  C/w BB     COPD  Stable, no home Rx?     PUD  C/w PPI, increase dose to BID    18.5 - 24.9 Normal weight / Body mass index is 22.26 kg/m². Estimated discharge date: -  Barriers: clinical improvement    Code status: Full  Prophylaxis: Hep SQ  Recommended Disposition: Home w/Family     Subjective:     Patient was seen and examined. No acute events overnight. Discussed with RN overnight events. All patient's questions were answered. \"We will having pain in my belly\"    Review of Systems:  Symptom Y/N Comments  Symptom Y/N Comments   Fever/Chills n   Chest Pain n    Poor Appetite    Edema     Cough n   Abdominal Pain y    Sputum    Joint Pain     SOB/LOPEZ n   Pruritis/Rash     Nausea/vomit n   Tolerating PT/OT     Diarrhea    Tolerating Diet y    Constipation    Other       Could NOT obtain due to:          Objective:     VITALS:   Last 24hrs VS reviewed since prior progress note.  Most recent are:  Patient Vitals for the past 24 hrs:   Temp Pulse Resp BP SpO2   21 0353 98.4 °F (36.9 °C) 77 16 (!) 94/44 100 %   21 2255 97.2 °F (36.2 °C) 69 16 (!) 91/47 97 %   21 2113 98.3 °F (36.8 °C) 72 16 (!) 91/44 96 % 12/29/21 1614 97.7 °F (36.5 °C) 70 18 (!) 105/50 100 %   12/29/21 1505 -- 67 -- (!) 103/52 --   12/29/21 1500 -- 65 -- (!) 111/56 --   12/29/21 1459 -- 68 -- (!) 75/52 --   12/29/21 1300 -- 62 -- (!) 102/50 --     No intake or output data in the 24 hours ending 12/30/21 1229     I had a face to face encounter and independently examined this patient on 12/30/2021, as outlined below:  PHYSICAL EXAM:  General: WD, WN. Alert, cooperative, no acute distress    EENT:  EOMI. Anicteric sclerae. MMM  Resp:  CTA bilaterally, no wheezing or rales. No accessory muscle use  CV:  Regular  rhythm,  No edema  GI:  Soft, Non distended, Non tender. +Bowel sounds  Neurologic:  Alert and oriented X 3, normal speech,   Psych:   Good insight. Not anxious nor agitated  Skin:  No rashes. No jaundice    Reviewed most current lab test results and cultures  YES  Reviewed most current radiology test results   YES  Review and summation of old records today    NO  Reviewed patient's current orders and MAR    YES  PMH/ reviewed - no change compared to H&P  ________________________________________________________________________  Care Plan discussed with:    Comments   Patient X    Family      RN X    Care Manager     Consultant                        Multidiciplinary team rounds were held today with , nursing, pharmacist and clinical coordinator. Patient's plan of care was discussed; medications were reviewed and discharge planning was addressed.      ________________________________________________________________________  Total NON critical care TIME:  39   Minutes    Total CRITICAL CARE TIME Spent:   Minutes non procedure based      Comments   >50% of visit spent in counseling and coordination of care     ________________________________________________________________________  Mj Harrison MD     Procedures: see electronic medical records for all procedures/Xrays and details which were not copied into this note but were reviewed prior to creation of Plan. LABS:  I reviewed today's most current labs and imaging studies.   Pertinent labs include:  Recent Labs     12/28/21 0326 12/27/21 2025 12/27/21  1639   WBC 7.5 7.8 6.2   HGB 11.6 12.5 12.6   HCT 34.9* 36.8 37.2    245 246     Recent Labs     12/28/21 0326 12/27/21  1517    140   K 3.6 3.3*   * 109*   CO2 23 23   * 97   BUN 8 8   CREA 0.87 0.89   CA 9.0 10.2*   MG 1.8  --    PHOS 5.9*  --    ALB  --  3.9   TBILI  --  0.9   ALT  --  18       Signed: Kunal Rachel MD

## 2021-12-31 ENCOUNTER — APPOINTMENT (OUTPATIENT)
Dept: CT IMAGING | Age: 81
End: 2021-12-31
Attending: INTERNAL MEDICINE
Payer: MEDICARE

## 2021-12-31 LAB
ANION GAP SERPL CALC-SCNC: 4 MMOL/L (ref 5–15)
BUN SERPL-MCNC: 11 MG/DL (ref 6–20)
BUN/CREAT SERPL: 11 (ref 12–20)
CALCIUM SERPL-MCNC: 8.8 MG/DL (ref 8.5–10.1)
CHLORIDE SERPL-SCNC: 112 MMOL/L (ref 97–108)
CO2 SERPL-SCNC: 24 MMOL/L (ref 21–32)
CREAT SERPL-MCNC: 1.01 MG/DL (ref 0.55–1.02)
GLUCOSE SERPL-MCNC: 108 MG/DL (ref 65–100)
LIPASE SERPL-CCNC: 98 U/L (ref 73–393)
POTASSIUM SERPL-SCNC: 4.2 MMOL/L (ref 3.5–5.1)
SODIUM SERPL-SCNC: 140 MMOL/L (ref 136–145)

## 2021-12-31 PROCEDURE — 74011250637 HC RX REV CODE- 250/637: Performed by: EMERGENCY MEDICINE

## 2021-12-31 PROCEDURE — 80048 BASIC METABOLIC PNL TOTAL CA: CPT

## 2021-12-31 PROCEDURE — 96376 TX/PRO/DX INJ SAME DRUG ADON: CPT

## 2021-12-31 PROCEDURE — 83690 ASSAY OF LIPASE: CPT

## 2021-12-31 PROCEDURE — 36415 COLL VENOUS BLD VENIPUNCTURE: CPT

## 2021-12-31 PROCEDURE — 74011000636 HC RX REV CODE- 636: Performed by: INTERNAL MEDICINE

## 2021-12-31 PROCEDURE — 74011000250 HC RX REV CODE- 250: Performed by: INTERNAL MEDICINE

## 2021-12-31 PROCEDURE — G0378 HOSPITAL OBSERVATION PER HR: HCPCS

## 2021-12-31 PROCEDURE — 74177 CT ABD & PELVIS W/CONTRAST: CPT

## 2021-12-31 PROCEDURE — 74011250636 HC RX REV CODE- 250/636: Performed by: INTERNAL MEDICINE

## 2021-12-31 PROCEDURE — 74011250637 HC RX REV CODE- 250/637: Performed by: INTERNAL MEDICINE

## 2021-12-31 PROCEDURE — 74011250637 HC RX REV CODE- 250/637: Performed by: NURSE PRACTITIONER

## 2021-12-31 RX ORDER — BARIUM SULFATE 20 MG/ML
900 SUSPENSION ORAL
Status: COMPLETED | OUTPATIENT
Start: 2021-12-31 | End: 2021-12-31

## 2021-12-31 RX ADMIN — CILOSTAZOL 50 MG: 100 TABLET ORAL at 09:59

## 2021-12-31 RX ADMIN — ONDANSETRON 4 MG: 2 INJECTION INTRAMUSCULAR; INTRAVENOUS at 17:55

## 2021-12-31 RX ADMIN — ACETAMINOPHEN 650 MG: 325 TABLET ORAL at 17:55

## 2021-12-31 RX ADMIN — PANTOPRAZOLE SODIUM 40 MG: 40 TABLET, DELAYED RELEASE ORAL at 17:55

## 2021-12-31 RX ADMIN — Medication 10 ML: at 22:20

## 2021-12-31 RX ADMIN — ALPRAZOLAM 1 MG: 0.5 TABLET ORAL at 17:55

## 2021-12-31 RX ADMIN — IOPAMIDOL 100 ML: 755 INJECTION, SOLUTION INTRAVENOUS at 14:34

## 2021-12-31 RX ADMIN — METOPROLOL TARTRATE 25 MG: 25 TABLET, FILM COATED ORAL at 09:59

## 2021-12-31 RX ADMIN — BARIUM SULFATE 900 ML: 20 SUSPENSION ORAL at 12:42

## 2021-12-31 RX ADMIN — MONTELUKAST 10 MG: 10 TABLET, FILM COATED ORAL at 09:59

## 2021-12-31 RX ADMIN — ONDANSETRON 4 MG: 2 INJECTION INTRAMUSCULAR; INTRAVENOUS at 23:25

## 2021-12-31 RX ADMIN — PANTOPRAZOLE SODIUM 40 MG: 40 TABLET, DELAYED RELEASE ORAL at 10:00

## 2021-12-31 RX ADMIN — Medication 10 ML: at 05:32

## 2021-12-31 RX ADMIN — ACETAMINOPHEN 650 MG: 325 TABLET ORAL at 09:59

## 2021-12-31 RX ADMIN — CITALOPRAM HYDROBROMIDE 40 MG: 20 TABLET ORAL at 09:59

## 2021-12-31 RX ADMIN — Medication 10 ML: at 14:00

## 2021-12-31 RX ADMIN — ASPIRIN 81 MG CHEWABLE TABLET 81 MG: 81 TABLET CHEWABLE at 09:59

## 2021-12-31 NOTE — PROGRESS NOTES
Order entered for oral contrast for CT, please call [phone] when pt has started drinking for their CT abd/pelvis.   Thank you

## 2021-12-31 NOTE — PROGRESS NOTES
Transition of Care Plan:     RUR: n/a - obs   Disposition: Home with HHPT - referral sent to [address] [place] [address]  Follow up appointments: PCP and specialist as indicated   DME needed: TBD   Transportation at Discharge: Dtr to transport at [address] or means to access home: Dtr has  IM Medicare Letter: n/a - unless upgraded to inpatient    Is patient a BCPI-A Bundle: No              If yes, was Bundle Letter given?:    Is patient a  and connected with the [address] [place] [address] Road  If yes, was [employer] transfer form completed and VA notified? Caregiver Contact: Dtr - Kennedy heard - [phone]  Discharge Caregiver contacted prior to discharge? To be contacted prior to dc            CM reviewed chart. CM sent referral to [facility]. Pt's NADEGE is 1/1/22. CM will continue to follow.     Catrachita Salinas MSW  Care Management, [address]

## 2021-12-31 NOTE — PROGRESS NOTES
Hospitalist Progress Note    NAME: Agueda Caceres   :  1940   MRN:  [id number]       Assessment / Plan:    Chest pain/abdominal pain  Cardiology consulted-appreciate input. Troponin trended less than reference range  Echo showed nl EF. Stress test negative   heparin gtt discontinued  ASA, metoprolol 25 mg PO Q12  CXR showed Hazy opacification of the left mid and lower lung, consistent with  Pneumonia. Procalcitonin <0.05 so no need for the antibiotics. Febrile. C/w Pletal  High intensity statin  EKG with inferolateral ST/T wave changes   Pt is still having chest/abdpain. Given morphine which helped overnight. Continue PPI increased dose to twice daily  PT/OT. She is complaining more of epigastric pain radiating to the back, order the lipase and CT abdomen developed pancreatitis  Patient is showing pain medication seeking behavior     Dementia? Delirium precautions     HTN  C/w BB     COPD  Stable, no home Rx?     PUD  C/w PPI, increase dose to BID    18.5 - 24.9 Normal weight / Body mass index is 22.26 kg/m². Estimated discharge date:   Barriers: clinical improvement    Code status: Full  Prophylaxis: Hep SQ  Recommended Disposition: Home w/Family     Subjective:     Patient was seen and examined. No acute events overnight. Discussed with RN overnight events. All patient's questions were answered. \"Still doing okay but cannot tolerate my abdominal pain. \"    Review of Systems:  Symptom Y/N Comments  Symptom Y/N Comments   Fever/Chills n   Chest Pain n    Poor Appetite    Edema     Cough n   Abdominal Pain y    Sputum    Joint Pain     SOB/LOPEZ n   Pruritis/Rash     Nausea/vomit n   Tolerating PT/OT     Diarrhea    Tolerating Diet y    Constipation    Other       Could NOT obtain due to:          Objective:     VITALS:   Last 24hrs VS reviewed since prior progress note.  Most recent are:  Patient Vitals for the past 24 hrs:   Temp Pulse Resp BP SpO2   21 0956 99 °F (37.2 °C) (!) 59 20 110/61 98 %   12/31/21 0441 97.8 °F (36.6 °C) 60 16 (!) 109/57 99 %   12/31/21 0315 97.9 °F (36.6 °C) (!) 55 16 (!) 87/50 95 %   12/30/21 2341 99.4 °F (37.4 °C) (!) 57 14 (!) 99/55 94 %   12/30/21 2004 99.1 °F (37.3 °C) 65 18 (!) 106/52 98 %   12/30/21 1443 97.9 °F (36.6 °C) 62 16 (!) 103/49 98 %   12/30/21 1304 98.5 °F (36.9 °C) 65 16 (!) 94/42 98 %     No intake or output data in the 24 hours ending 12/31/21 1227     I had a face to face encounter and independently examined this patient on 12/31/2021, as outlined below:  PHYSICAL EXAM:  General: WD, WN. Alert, cooperative, no acute distress    EENT:  EOMI. Anicteric sclerae. MMM  Resp:  CTA bilaterally, no wheezing or rales. No accessory muscle use  CV:  Regular  rhythm,  No edema  GI:  Soft, Non distended, Non tender. +Bowel sounds  Neurologic:  Alert and oriented X 3, normal speech,   Psych:   Good insight. Not anxious nor agitated  Skin:  No rashes. No jaundice    Reviewed most current lab test results and cultures  YES  Reviewed most current radiology test results   YES  Review and summation of old records today    NO  Reviewed patient's current orders and MAR    YES  PMH/SH reviewed - no change compared to H&P  ________________________________________________________________________  Care Plan discussed with:    Comments   Patient X    Family      RN X    Care Manager     Consultant                        Multidiciplinary team rounds were held today with , nursing, pharmacist and clinical coordinator. Patient's plan of care was discussed; medications were reviewed and discharge planning was addressed.      ________________________________________________________________________  Total NON critical care TIME:  39   Minutes    Total CRITICAL CARE TIME Spent:   Minutes non procedure based      Comments   >50% of visit spent in counseling and coordination of care ________________________________________________________________________  Qing Calabrese MD     Procedures: see electronic medical records for all procedures/Xrays and details which were not copied into this note but were reviewed prior to creation of Plan. LABS:  I reviewed today's most current labs and imaging studies. Pertinent labs include:  No results for input(s): WBC, HGB, HCT, PLT, HGBEXT, HCTEXT, PLTEXT, HGBEXT, HCTEXT, PLTEXT in the last 72 hours. No results for input(s): NA, K, CL, CO2, GLU, BUN, CREA, CA, MG, PHOS, ALB, TBIL, TBILI, ALT, INR, INREXT, INREXT in the last 72 hours.     No lab exists for component: SGOT    Signed: Qing Calabrese MD

## 2021-12-31 NOTE — PROGRESS NOTES
Bedside shift change report given to Harry Beyer, (oncoming nurse) by Nargis Mooney RN (offgoing nurse).   Report included the following information SBAR, Kardex, Intake/Output and MAR

## 2022-01-01 VITALS
SYSTOLIC BLOOD PRESSURE: 101 MMHG | BODY MASS INDEX: 22.22 KG/M2 | WEIGHT: 110.23 LBS | HEART RATE: 61 BPM | OXYGEN SATURATION: 100 % | RESPIRATION RATE: 18 BRPM | TEMPERATURE: 98 F | HEIGHT: 59 IN | DIASTOLIC BLOOD PRESSURE: 48 MMHG

## 2022-01-01 PROBLEM — R07.89 ATYPICAL CHEST PAIN: Status: RESOLVED | Noted: 2021-12-27 | Resolved: 2022-01-01

## 2022-01-01 PROBLEM — R07.9 CHEST PAIN: Status: RESOLVED | Noted: 2021-12-27 | Resolved: 2022-01-01

## 2022-01-01 LAB
ALBUMIN SERPL-MCNC: 2.8 G/DL (ref 3.5–5)
ALBUMIN/GLOB SERPL: 0.7 {RATIO} (ref 1.1–2.2)
ALP SERPL-CCNC: 66 U/L (ref 45–117)
ALT SERPL-CCNC: 17 U/L (ref 12–78)
ANION GAP SERPL CALC-SCNC: 5 MMOL/L (ref 5–15)
APPEARANCE UR: CLEAR
AST SERPL-CCNC: 20 U/L (ref 15–37)
BACTERIA SPEC CULT: NORMAL
BACTERIA URNS QL MICRO: NEGATIVE /HPF
BASOPHILS # BLD: 0 K/UL (ref 0–0.1)
BASOPHILS NFR BLD: 1 % (ref 0–1)
BILIRUB SERPL-MCNC: 0.8 MG/DL (ref 0.2–1)
BILIRUB UR QL: NEGATIVE
BUN SERPL-MCNC: 11 MG/DL (ref 6–20)
BUN/CREAT SERPL: 10 (ref 12–20)
CALCIUM SERPL-MCNC: 8.6 MG/DL (ref 8.5–10.1)
CHLORIDE SERPL-SCNC: 109 MMOL/L (ref 97–108)
CO2 SERPL-SCNC: 25 MMOL/L (ref 21–32)
COLOR UR: NORMAL
CREAT SERPL-MCNC: 1.05 MG/DL (ref 0.55–1.02)
DIFFERENTIAL METHOD BLD: ABNORMAL
EOSINOPHIL # BLD: 0.2 K/UL (ref 0–0.4)
EOSINOPHIL NFR BLD: 3 % (ref 0–7)
EPITH CASTS URNS QL MICRO: NORMAL /LPF
ERYTHROCYTE [DISTWIDTH] IN BLOOD BY AUTOMATED COUNT: 13.2 % (ref 11.5–14.5)
GLOBULIN SER CALC-MCNC: 3.9 G/DL (ref 2–4)
GLUCOSE SERPL-MCNC: 96 MG/DL (ref 65–100)
GLUCOSE UR STRIP.AUTO-MCNC: NEGATIVE MG/DL
HCT VFR BLD AUTO: 33 % (ref 35–47)
HGB BLD-MCNC: 11 G/DL (ref 11.5–16)
HGB UR QL STRIP: NEGATIVE
HYALINE CASTS URNS QL MICRO: NORMAL /LPF (ref 0–5)
IMM GRANULOCYTES # BLD AUTO: 0 K/UL (ref 0–0.04)
IMM GRANULOCYTES NFR BLD AUTO: 0 % (ref 0–0.5)
KETONES UR QL STRIP.AUTO: NEGATIVE MG/DL
LEUKOCYTE ESTERASE UR QL STRIP.AUTO: NEGATIVE
LYMPHOCYTES # BLD: 2.5 K/UL (ref 0.8–3.5)
LYMPHOCYTES NFR BLD: 39 % (ref 12–49)
MCH RBC QN AUTO: 31.2 PG (ref 26–34)
MCHC RBC AUTO-ENTMCNC: 33.3 G/DL (ref 30–36.5)
MCV RBC AUTO: 93.5 FL (ref 80–99)
MONOCYTES # BLD: 0.7 K/UL (ref 0–1)
MONOCYTES NFR BLD: 10 % (ref 5–13)
NEUTS SEG # BLD: 3.2 K/UL (ref 1.8–8)
NEUTS SEG NFR BLD: 47 % (ref 32–75)
NITRITE UR QL STRIP.AUTO: NEGATIVE
NRBC # BLD: 0 K/UL (ref 0–0.01)
NRBC BLD-RTO: 0 PER 100 WBC
PH UR STRIP: 5.5 [PH] (ref 5–8)
PLATELET # BLD AUTO: 194 K/UL (ref 150–400)
PMV BLD AUTO: 9.6 FL (ref 8.9–12.9)
POTASSIUM SERPL-SCNC: 4.2 MMOL/L (ref 3.5–5.1)
PROT SERPL-MCNC: 6.7 G/DL (ref 6.4–8.2)
PROT UR STRIP-MCNC: NEGATIVE MG/DL
RBC # BLD AUTO: 3.53 M/UL (ref 3.8–5.2)
RBC #/AREA URNS HPF: NORMAL /HPF (ref 0–5)
SERVICE CMNT-IMP: NORMAL
SODIUM SERPL-SCNC: 139 MMOL/L (ref 136–145)
SP GR UR REFRACTOMETRY: 1.03 (ref 1–1.03)
UA: UC IF INDICATED,UAUC: NORMAL
UROBILINOGEN UR QL STRIP.AUTO: 1 EU/DL (ref 0.2–1)
WBC # BLD AUTO: 6.6 K/UL (ref 3.6–11)
WBC URNS QL MICRO: NORMAL /HPF (ref 0–4)

## 2022-01-01 PROCEDURE — 74011250636 HC RX REV CODE- 250/636: Performed by: NURSE PRACTITIONER

## 2022-01-01 PROCEDURE — 85025 COMPLETE CBC W/AUTO DIFF WBC: CPT

## 2022-01-01 PROCEDURE — 74011250637 HC RX REV CODE- 250/637: Performed by: INTERNAL MEDICINE

## 2022-01-01 PROCEDURE — 96376 TX/PRO/DX INJ SAME DRUG ADON: CPT

## 2022-01-01 PROCEDURE — 80053 COMPREHEN METABOLIC PANEL: CPT

## 2022-01-01 PROCEDURE — G0378 HOSPITAL OBSERVATION PER HR: HCPCS

## 2022-01-01 PROCEDURE — 81001 URINALYSIS AUTO W/SCOPE: CPT

## 2022-01-01 PROCEDURE — 36415 COLL VENOUS BLD VENIPUNCTURE: CPT

## 2022-01-01 PROCEDURE — 74011250637 HC RX REV CODE- 250/637: Performed by: EMERGENCY MEDICINE

## 2022-01-01 PROCEDURE — 74011250637 HC RX REV CODE- 250/637: Performed by: NURSE PRACTITIONER

## 2022-01-01 RX ORDER — ATORVASTATIN CALCIUM 40 MG/1
40 TABLET, FILM COATED ORAL
Qty: 30 TABLET | Refills: 0 | Status: SHIPPED | OUTPATIENT
Start: 2022-01-01

## 2022-01-01 RX ORDER — MORPHINE SULFATE 2 MG/ML
2 INJECTION, SOLUTION INTRAMUSCULAR; INTRAVENOUS ONCE
Status: COMPLETED | OUTPATIENT
Start: 2022-01-01 | End: 2022-01-01

## 2022-01-01 RX ORDER — METOPROLOL TARTRATE 25 MG/1
25 TABLET, FILM COATED ORAL EVERY 12 HOURS
Qty: 60 TABLET | Refills: 0 | Status: SHIPPED | OUTPATIENT
Start: 2022-01-01

## 2022-01-01 RX ORDER — NITROGLYCERIN 0.4 MG/1
0.4 TABLET SUBLINGUAL
Qty: 30 TABLET | Refills: 0 | Status: SHIPPED | OUTPATIENT
Start: 2022-01-01

## 2022-01-01 RX ADMIN — MORPHINE SULFATE 1 MG: 2 INJECTION, SOLUTION INTRAMUSCULAR; INTRAVENOUS at 02:47

## 2022-01-01 RX ADMIN — CITALOPRAM HYDROBROMIDE 40 MG: 20 TABLET ORAL at 10:29

## 2022-01-01 RX ADMIN — TRAZODONE HYDROCHLORIDE 50 MG: 50 TABLET ORAL at 00:33

## 2022-01-01 RX ADMIN — ACETAMINOPHEN 650 MG: 325 TABLET ORAL at 00:50

## 2022-01-01 RX ADMIN — NITROGLYCERIN 0.4 MG: 0.4 TABLET, ORALLY DISINTEGRATING SUBLINGUAL at 01:18

## 2022-01-01 RX ADMIN — MONTELUKAST 10 MG: 10 TABLET, FILM COATED ORAL at 10:29

## 2022-01-01 RX ADMIN — METOPROLOL TARTRATE 25 MG: 25 TABLET, FILM COATED ORAL at 10:29

## 2022-01-01 RX ADMIN — CILOSTAZOL 50 MG: 100 TABLET ORAL at 10:29

## 2022-01-01 RX ADMIN — PANTOPRAZOLE SODIUM 40 MG: 40 TABLET, DELAYED RELEASE ORAL at 10:29

## 2022-01-01 RX ADMIN — ATORVASTATIN CALCIUM 40 MG: 40 TABLET, FILM COATED ORAL at 00:33

## 2022-01-01 RX ADMIN — ASPIRIN 81 MG CHEWABLE TABLET 81 MG: 81 TABLET CHEWABLE at 10:29

## 2022-01-01 NOTE — PROGRESS NOTES
End of Shift Note    Bedside shift change report given to [facility] (oncoming nurse) by Malcolm Francisco RN (offgoing nurse). Report included the following information SBAR, Kardex, ED Summary, Intake/Output, MAR, Recent Results and Cardiac Rhythm sinus tesha, NSR    Shift worked:  [id number]     Shift summary and any significant changes:     Patient c/o abdominal right flank. Medicate with morphine with good relief. Patient c/o chest pressure with right side neck pain. Patient medicate with nitro SL.  with good relief. Patient was able to sleep post  Pain medication. Patient states pain after voiding. Order for UA and CBC given. Lab sent. Patient hypotensive through out the night. Continue to monitor. Patient c/ nausea. Medicate with zofran with good relief. Concerns for physician to address:  see above     Zone phone for oncoming shift:   [phone]       Activity:  Activity Level: Up ad paramjit  Number times ambulated in hallways past shift: 0  Number of times OOB to chair past shift: 3    Cardiac:   Cardiac Monitoring: Yes      Cardiac Rhythm: Sinus Rhythm,Sinus Mariea Sabine    Access:   Current line(s): PIV     Genitourinary:   Urinary status: voiding    Respiratory:   O2 Device: None (Room air)  Chronic home O2 use?: NO  Incentive spirometer at bedside: NO     GI:  Last Bowel Movement Date: 12/31/21  Current diet:  ADULT DIET Regular; Low Fat/Low Chol/High Fiber/2 gm Na  Passing flatus: YES  Tolerating current diet: YES       Pain Management:   Patient states pain is manageable on current regimen: YES    Skin:  Aiden Score: 19  Interventions: speciality bed, increase time out of bed and PT/OT consult    Patient Safety:  Fall Score:  Total Score: 2  Interventions: bed/chair alarm, assistive device (walker, cane, etc), gripper socks and pt to call before getting OOB  High Fall Risk: Yes    Length of Stay:  Expected LOS: - - -  Actual LOS: 0      Moira Saldivar RN

## 2022-01-01 NOTE — PROGRESS NOTES
Received message from patient's nurse stating:    Patient C/O rights side abdomen and radiates to right flank pain 8/10. Patient states 8/10. Patient states it painful during urination. Request for UA and CBC? Thank you! Discussion / orders:    · Urinalysis with reflex culture  · CBC  · CMP         Please note that this note was dictated using Dragon computer voice recognition software. Quite often unanticipated grammatical, syntax, homophones, and other interpretive errors are inadvertently transcribed by the computer software. Please disregard these errors. Please excuse any errors that have escaped final proofreading.      Signed by:  Willi Arredondo DNP, ACNP-BC

## 2022-01-01 NOTE — PROGRESS NOTES
Problem: Falls - Risk of  Goal: *Absence of Falls  Description: Document [place] Fall Risk and appropriate interventions in the flowsheet.   Outcome: Progressing Towards Goal  Note: Fall Risk Interventions:  Mobility Interventions: Bed/chair exit alarm         Medication Interventions: Teach patient to arise slowly    Elimination Interventions: Call light in reach    History of Falls Interventions: Evaluate medications/consider consulting pharmacy         Problem: Patient Education: Go to Patient Education Activity  Goal: Patient/Family Education  Outcome: Progressing Towards Goal     Problem: Pain  Goal: *Control of Pain  Outcome: Progressing Towards Goal     Problem: Patient Education: Go to Patient Education Activity  Goal: Patient/Family Education  Outcome: Progressing Towards Goal     Problem: Patient Education: Go to Patient Education Activity  Goal: Patient/Family Education  Outcome: Progressing Towards Goal     Problem: Patient Education: Go to Patient Education Activity  Goal: Patient/Family Education  Outcome: Progressing Towards Goal

## 2022-01-01 NOTE — PROGRESS NOTES
Received message from patient's nurse stating:    Patient c/o abdominal pain has not subsided. Tylenol not working. Request for pain medication         Discussion / orders:    Morphine 2 mg iv x 1         Please note that this note was dictated using Dragon computer voice recognition software. Quite often unanticipated grammatical, syntax, homophones, and other interpretive errors are inadvertently transcribed by the computer software. Please disregard these errors. Please excuse any errors that have escaped final proofreading.      Signed by:  Elva Arellano DNP, ACNP-BC

## 2022-01-01 NOTE — PROGRESS NOTES
Problem: Falls - Risk of  Goal: *Absence of Falls  Description: Document Padma Embs Fall Risk and appropriate interventions in the flowsheet.   Outcome: Progressing Towards Goal  Note: Fall Risk Interventions:  Mobility Interventions: Bed/chair exit alarm         Medication Interventions: Bed/chair exit alarm,Patient to call before getting OOB,Teach patient to arise slowly    Elimination Interventions: Bed/chair exit alarm,Call light in reach,Toileting schedule/hourly rounds    History of Falls Interventions: Bed/chair exit alarm,Door open when patient unattended,Room close to nurse's station         Problem: Patient Education: Go to Patient Education Activity  Goal: Patient/Family Education  Outcome: Progressing Towards Goal     Problem: Pain  Goal: *Control of Pain  1/1/2022 1016 by Sonali Harrington RN  Outcome: Progressing Towards Goal  1/1/2022 1015 by Kelsey MCDOWELL RN  Outcome: Progressing Towards Goal     Problem: Patient Education: Go to Patient Education Activity  Goal: Patient/Family Education  1/1/2022 1016 by Sonali Harrington RN  Outcome: Progressing Towards Goal  1/1/2022 1015 by Moira Lane RN  Outcome: Progressing Towards Goal     Problem: Pain  Goal: *Control of Pain  Outcome: Progressing Towards Goal     Problem: Patient Education: Go to Patient Education Activity  Goal: Patient/Family Education  Outcome: Progressing Towards Goal

## 2022-01-01 NOTE — PROGRESS NOTES
Patient C/O rights side abdomen and radiates to right flank pain 8/10. Patient states 8/10. Patient states it painful during urination. Purveyor NP notified and Request for UA and CBC.     0130  Patient also c/o right neck pain with chest pressure 7/10. Medicate with Nitrostat with good relief 0/10 rate. Purfeliciaor NP informed. 6008  Patient c/o abdominal pain has not subsided. Tylenol not working. Request for pain medication. Purveyor notified. Patient medicate with morphine with good relief.   patient sleeping

## 2022-01-01 NOTE — PROGRESS NOTES
Ready from CM standpoint. Transition of Care Plan:     RUR: N/A- currently obs   [address] (PT/OT/SN) & follow-up appts  Follow up appointments: PCP and specialist as indicated   DME needed: TBD   Transportation at Discharge: Dtr; ETA 1:30 PM  Keys or means to access home: Yes  IM Medicare Letter: n/a - unless upgraded to inpatient    Is patient a BCPI-A Bundle: No              If yes, was Bundle Letter given?: N/A   Is patient a  and connected with the [address] [place] [address] Road  If yes, was [employer] transfer form completed and [facility] notified? N/A  Caregiver Contact: Dtr- Kennedy Bowens, [phone]  Discharge Caregiver contacted prior to discharge? CM acknowledged discharge. Update 1:02 PM  CM received call back from pt's dtr reporting she will be at [address] to transport pt home; ETA 1:30 PM. Dtr also reports pt's insurance has changed to [employer]; will provide copy & place on pt's chart. Update 11:52 AM  CM met with pt at bedside to review [facility] plan for discharge re: home with [facility] & follow-up appts. [facility] 1/5; confirmed with attending MD soc date is ok. CM added details to AVS. Pt reports her dtr will transport her home at discharge. No further needs for discharge at this time. Initial note-  Reviewed pt's chart. HH referral sent to [facility] denied d/t pt being [place]. CM sent addtl referrals to  [facility] via [employer]. Will continue to follow & report updates.     Zee Durbin MSW  Care Management

## 2022-01-01 NOTE — DISCHARGE SUMMARY
Hospitalist Discharge Summary     Patient ID:  Tristen Wilhelm  [id number]  80 y.o.  1940 12/27/2021    PCP on record: Unknown, Provider, CEFERINO    Admit date: 12/27/2021  Discharge date and time: 1/1/2022    DISCHARGE DIAGNOSIS:    Chest pain/abdominal pain       Dementia?       HTN       COPD       PUD      CONSULTATIONS:  IP CONSULT TO CARDIOLOGY  IP CONSULT TO CARDIOLOGY  IP CONSULT TO HOSPITALIST    Excerpted HPI from H&P of Jillian Scanlon MD:    Karen Black is a 80 y.o. female with a past medical history significant for CAD, lung cancer, hypertension, GERD and questionable dementia who presents to the ED with chest pain. Patient is not a great historian but says the chest pain has been going on for the past 3 to 4 days. She describes the chest pain as crampy and located in the left side. It last for about 30 minutes. There is no radiation. She does have associated shortness of breath. Patient was recently in [place] visiting her son. She states the pain started on her way home while she was on the plane. She states that the pain will happen mostly at night but happens about 3-4 times a day. She says this has never happened before. No fevers/chills, nausea/vomiting or diarrhea.     ______________________________________________________________________  DISCHARGE SUMMARY/HOSPITAL COURSE:  for full details see H&P, daily progress notes, labs, consult notes. Chest pain/abdominal pain  Cardiology consulted-appreciate input. Troponin trended less than reference range  Echo showed nl EF. Stress test negative   heparin gtt discontinued  ASA, metoprolol 25 mg PO Q12  CXR showed Hazy opacification of the left mid and lower lung, consistent with  Pneumonia.   Procalcitonin <0.05 so no need for the antibiotics. Febrile.   High intensity statin  EKG with inferolateral ST/T wave changes   Evaluated by cardiology, no further intervention needed  Continue PPI   CT scan abdomen with no acute processes  Lipase within normal limits      Dementia? Delirium precautions     HTN  C/w BB     COPD  Stable     PUD  C/w PPI    I talked to patient's daughter Stefan Mealing over the phone and discussed with her. I raise my concern that patient have pain medication seeking behavior, patient is following with pain management as an outpatient as per her daughter.    _______________________________________________________________________  Patient seen and examined by me on discharge day. Pertinent Findings:  Gen:    Not in distress  Chest: Clear lungs  CVS:   Regular rhythm. No edema  Abd:  Soft, not distended, not tender  Neuro:  Alert,   _______________________________________________________________________  DISCHARGE MEDICATIONS:   Current Discharge Medication List      START taking these medications    Details   atorvastatin (LIPITOR) 40 mg tablet Take 1 Tablet by mouth nightly. Qty: 30 Tablet, Refills: 0  Start date: 1/1/2022      metoprolol tartrate (LOPRESSOR) 25 mg tablet Take 1 Tablet by mouth every twelve (12) hours. Qty: 60 Tablet, Refills: 0  Start date: 1/1/2022      nitroglycerin (NITROSTAT) 0.4 mg SL tablet 1 Tablet by SubLINGual route every five (5) minutes as needed for Chest Pain for up to 30 doses. Up to 3 doses. Qty: 30 Tablet, Refills: 0  Start date: 1/1/2022         CONTINUE these medications which have NOT CHANGED    Details   omeprazole (PRILOSEC) 20 mg capsule Take 20 mg by mouth daily. naproxen (NAPROSYN) 375 mg tablet Take 375 mg by mouth as needed. cilostazoL (PLETAL) 50 mg tablet TAKE 1 TABLET BY MOUTH ONCE DAILY      citalopram hydrobromide (CITALOPRAM PO) Take  by mouth daily. Indications: Patient unsure of the dosage      HYDROcodone-acetaminophen (NORCO) 7.5-325 mg per tablet Take  by mouth two (2) times daily as needed for Pain.     Associated Diagnoses: Coronary artery disease involving native coronary artery of native heart without angina pectoris ondansetron hcl (ZOFRAN, AS HYDROCHLORIDE,) 4 mg tablet Take 4 mg by mouth every eight (8) hours as needed for Nausea. Associated Diagnoses: Coronary artery disease involving native coronary artery of native heart without angina pectoris      clopidogrel (PLAVIX) 75 mg tab TAKE 1 TABLET BY MOUTH DAILY  Qty: 90 Tab, Refills: 11    Comments: **Patient requests 90 days supply**      montelukast (SINGULAIR) 10 mg tablet Take 10 mg by mouth daily. pantoprazole (PROTONIX) 20 mg tablet Take 20 mg by mouth daily. cholecalciferol, vitamin D3, 2,000 unit tab Take  by mouth every other day. polyethylene glycol (MIRALAX) 17 gram/dose powder Take 17 g by mouth every other day. aspirin delayed-release 81 mg tablet Take  by mouth daily. QUEtiapine (SEROQUEL) 100 mg tablet Take 50 mg by mouth nightly. escitalopram oxalate (LEXAPRO) 10 mg tablet Take 10 mg by mouth daily. promethazine (PHENERGAN) 25 mg tablet Take 1 tablet by mouth every six (6) hours as needed for Nausea. Qty: 15 tablet, Refills: 0      ALPRAZolam (XANAX) 1 mg tablet Take 1 mg by mouth every evening. traZODone (DESYREL) 100 mg tablet Take 50 mg by mouth nightly. tiotropium (SPIRIVA WITH HANDIHALER) 18 mcg inhalation capsule Take 1 Cap by inhalation daily. STOP taking these medications       simvastatin (ZOCOR) 40 mg tablet Comments:   Reason for Stopping:                 Patient Follow Up Instructions:    Activity: Activity as tolerated  Diet: Resume previous diet  Wound Care: None needed      Follow-up Information     Follow up With Specialties Details Why Contact Linda Terrell MD Cardiology, Interventional Cardiology Schedule an appointment as soon as possible for a visit in 1 month for cardiology follow up  Jerod Keen  [facility] [phone]          ________________________________________________________________    Risk of deterioration: Low    Condition at Discharge:  Stable  __________________________________________________________________    Disposition  Home with family and home health services    ____________________________________________________________________    Code Status: Full Code  ___________________________________________________________________      Total time in minutes spent coordinating this discharge (includes going over instructions, follow-up, prescriptions, and preparing report for sign off to her PCP) :  >30 minutes    Signed:  Georgian Opitz, MD

## 2022-03-19 PROBLEM — I10 PRIMARY HYPERTENSION: Status: ACTIVE | Noted: 2021-12-27

## 2022-09-13 ENCOUNTER — TRANSCRIBE ORDER (OUTPATIENT)
Dept: SCHEDULING | Age: 82
End: 2022-09-13

## 2022-09-13 DIAGNOSIS — R07.89 OTHER CHEST PAIN: Primary | ICD-10-CM

## 2022-09-20 ENCOUNTER — TRANSCRIBE ORDER (OUTPATIENT)
Dept: SCHEDULING | Age: 82
End: 2022-09-20

## 2022-09-20 DIAGNOSIS — N64.4 BREAST PAIN: Primary | ICD-10-CM

## 2022-09-30 ENCOUNTER — HOSPITAL ENCOUNTER (OUTPATIENT)
Dept: MAMMOGRAPHY | Age: 82
Discharge: HOME | End: 2022-09-30
Payer: MEDICARE

## 2022-09-30 DIAGNOSIS — N64.4 BREAST PAIN: ICD-10-CM

## 2022-09-30 PROCEDURE — 77062 BREAST TOMOSYNTHESIS BI: CPT

## 2022-10-11 ENCOUNTER — TRANSCRIBE ORDER (OUTPATIENT)
Dept: SCHEDULING | Age: 82
End: 2022-10-11

## 2022-10-11 DIAGNOSIS — R93.89 ABNORMAL CXR: Primary | ICD-10-CM

## 2022-10-24 ENCOUNTER — HOSPITAL ENCOUNTER (OUTPATIENT)
Dept: CT IMAGING | Age: 82
Discharge: HOME | End: 2022-10-24
Payer: MEDICARE

## 2022-10-24 DIAGNOSIS — R93.89 ABNORMAL CXR: ICD-10-CM

## 2022-10-24 LAB — CREAT BLD-MCNC: 0.9 MG/DL (ref 0.6–1.3)

## 2022-10-24 PROCEDURE — 71260 CT THORAX DX C+: CPT

## 2022-10-24 PROCEDURE — 82565 ASSAY OF CREATININE: CPT

## 2022-10-24 PROCEDURE — 74011000636 HC RX REV CODE- 636: Performed by: RADIOLOGY

## 2022-10-24 RX ADMIN — IOPAMIDOL 100 ML: 755 INJECTION, SOLUTION INTRAVENOUS at 16:24

## 2022-12-22 ENCOUNTER — TRANSCRIBE ORDER (OUTPATIENT)
Dept: SCHEDULING | Age: 82
End: 2022-12-22

## 2022-12-22 DIAGNOSIS — M79.89 LEFT LEG SWELLING: Primary | ICD-10-CM

## 2022-12-23 ENCOUNTER — HOSPITAL ENCOUNTER (OUTPATIENT)
Dept: ULTRASOUND IMAGING | Age: 82
Discharge: HOME | End: 2022-12-23
Attending: NURSE PRACTITIONER
Payer: MEDICARE

## 2022-12-23 ENCOUNTER — TRANSCRIBE ORDER (OUTPATIENT)
Dept: SCHEDULING | Age: 82
End: 2022-12-23

## 2022-12-23 DIAGNOSIS — R91.1 LUNG NODULE: Primary | ICD-10-CM

## 2022-12-23 DIAGNOSIS — M79.89 LEFT LEG SWELLING: ICD-10-CM

## 2022-12-23 PROCEDURE — 93971 EXTREMITY STUDY: CPT

## 2022-12-27 ENCOUNTER — TRANSCRIBE ORDER (OUTPATIENT)
Dept: SCHEDULING | Age: 82
End: 2022-12-27

## 2022-12-27 DIAGNOSIS — R91.1 LUNG NODULE: Primary | ICD-10-CM

## 2022-12-30 ENCOUNTER — HOSPITAL ENCOUNTER (OUTPATIENT)
Dept: CT IMAGING | Age: 82
Discharge: HOME | End: 2022-12-30
Attending: NURSE PRACTITIONER
Payer: MEDICARE

## 2022-12-30 DIAGNOSIS — R91.1 LUNG NODULE: ICD-10-CM

## 2022-12-30 PROCEDURE — 74011000636 HC RX REV CODE- 636

## 2022-12-30 PROCEDURE — 71260 CT THORAX DX C+: CPT

## 2022-12-30 RX ADMIN — IOPAMIDOL 80 ML: 755 INJECTION, SOLUTION INTRAVENOUS at 09:21

## 2023-01-09 ENCOUNTER — TRANSCRIBE ORDER (OUTPATIENT)
Dept: SCHEDULING | Age: 83
End: 2023-01-09

## 2023-01-09 DIAGNOSIS — R91.1 LUNG NODULE: Primary | ICD-10-CM

## 2023-01-17 ENCOUNTER — HOSPITAL ENCOUNTER (OUTPATIENT)
Dept: PET IMAGING | Age: 83
Discharge: HOME | End: 2023-01-17
Attending: NURSE PRACTITIONER
Payer: MEDICARE

## 2023-01-17 VITALS — HEIGHT: 60 IN | BODY MASS INDEX: 25.52 KG/M2 | WEIGHT: 130 LBS

## 2023-01-17 DIAGNOSIS — R91.1 LUNG NODULE: ICD-10-CM

## 2023-01-17 LAB
GLUCOSE BLD STRIP.AUTO-MCNC: 88 MG/DL (ref 65–117)
SERVICE CMNT-IMP: NORMAL

## 2023-01-17 PROCEDURE — A9552 F18 FDG: HCPCS

## 2023-01-17 RX ORDER — FLUDEOXYGLUCOSE F-18 200 MCI/ML
10 INJECTION INTRAVENOUS ONCE
Status: COMPLETED | OUTPATIENT
Start: 2023-01-17 | End: 2023-01-17

## 2023-01-17 RX ADMIN — FLUDEOXYGLUCOSE F-18 9.89 MILLICURIE: 200 INJECTION INTRAVENOUS at 08:07

## 2023-02-06 ENCOUNTER — TRANSCRIBE ORDER (OUTPATIENT)
Dept: SCHEDULING | Age: 83
End: 2023-02-06

## 2023-02-06 DIAGNOSIS — R91.1 SOLITARY PULMONARY NODULE: Primary | ICD-10-CM

## 2023-03-20 ENCOUNTER — TRANSCRIBE ORDER (OUTPATIENT)
Dept: SCHEDULING | Age: 83
End: 2023-03-20

## 2023-03-20 DIAGNOSIS — M50.30 DDD (DEGENERATIVE DISC DISEASE), CERVICAL: ICD-10-CM

## 2023-03-20 DIAGNOSIS — M54.2 DISCOGENIC CERVICAL PAIN: Primary | ICD-10-CM

## 2023-03-20 DIAGNOSIS — M54.2 NECK PAIN: ICD-10-CM

## 2023-03-20 DIAGNOSIS — M54.12 RADICULITIS OF RIGHT CERVICAL REGION: ICD-10-CM

## 2023-04-14 ENCOUNTER — HOSPITAL ENCOUNTER (OUTPATIENT)
Dept: MRI IMAGING | Age: 83
Discharge: HOME | End: 2023-04-14
Attending: FAMILY MEDICINE
Payer: MEDICARE

## 2023-04-14 DIAGNOSIS — M54.12 RADICULITIS OF RIGHT CERVICAL REGION: ICD-10-CM

## 2023-04-14 DIAGNOSIS — M54.2 DISCOGENIC CERVICAL PAIN: ICD-10-CM

## 2023-04-14 DIAGNOSIS — M54.2 NECK PAIN: ICD-10-CM

## 2023-04-14 DIAGNOSIS — M50.30 DDD (DEGENERATIVE DISC DISEASE), CERVICAL: ICD-10-CM

## 2023-04-14 PROCEDURE — 72141 MRI NECK SPINE W/O DYE: CPT

## 2023-04-21 DIAGNOSIS — N64.4 BREAST PAIN: Primary | ICD-10-CM

## 2023-04-22 DIAGNOSIS — R91.1 LUNG NODULE: Primary | ICD-10-CM

## 2023-04-22 DIAGNOSIS — R91.1 SOLITARY PULMONARY NODULE: Primary | ICD-10-CM

## 2023-05-06 ENCOUNTER — HOSPITAL ENCOUNTER (OUTPATIENT)
Facility: HOSPITAL | Age: 83
End: 2023-05-06
Payer: MEDICARE

## 2023-05-06 DIAGNOSIS — R91.1 SOLITARY PULMONARY NODULE: ICD-10-CM

## 2023-05-06 PROCEDURE — 71250 CT THORAX DX C-: CPT

## 2023-11-22 ENCOUNTER — HOSPITAL ENCOUNTER (OUTPATIENT)
Facility: HOSPITAL | Age: 83
Discharge: HOME | End: 2023-11-25
Attending: INTERNAL MEDICINE
Payer: MEDICARE

## 2023-11-22 DIAGNOSIS — R91.1 SOLITARY PULMONARY NODULE: ICD-10-CM

## 2023-11-22 PROCEDURE — 71250 CT THORAX DX C-: CPT

## 2023-11-24 ENCOUNTER — HOSPITAL ENCOUNTER (OUTPATIENT)
Facility: HOSPITAL | Age: 83
End: 2023-11-24
Payer: MEDICARE

## 2023-11-24 DIAGNOSIS — N18.31 STAGE 3A CHRONIC KIDNEY DISEASE (HCC): ICD-10-CM

## 2023-11-24 PROCEDURE — 76770 US EXAM ABDO BACK WALL COMP: CPT

## 2024-05-15 ENCOUNTER — TELEPHONE (OUTPATIENT)
Age: 84
End: 2024-05-15

## 2024-05-15 NOTE — TELEPHONE ENCOUNTER
We received a referral on the patient for a New Hematology Consult. I was calling the patients daughter to set up not only that but also for her to have OPIC labs drawn prior to the New Hematology consult as well. I left a voicemail for her daughter on 5/15/24 asking her to give me a call back as soon as she can so I can schedule the patient for both appointments.    Thank you,

## 2024-06-04 ENCOUNTER — HOSPITAL ENCOUNTER (OUTPATIENT)
Facility: HOSPITAL | Age: 84
Discharge: HOME | End: 2024-06-07
Attending: INTERNAL MEDICINE
Payer: COMMERCIAL

## 2024-06-04 DIAGNOSIS — D72.819 LEUKOPENIA, UNSPECIFIED TYPE: ICD-10-CM

## 2024-06-04 DIAGNOSIS — R59.0 VIRCHOW'S NODE: ICD-10-CM

## 2024-06-04 DIAGNOSIS — D64.9 ANEMIA, UNSPECIFIED TYPE: ICD-10-CM

## 2024-06-04 LAB — CREAT BLD-MCNC: 1.1 MG/DL (ref 0.6–1.3)

## 2024-06-04 PROCEDURE — 6360000004 HC RX CONTRAST MEDICATION: Performed by: INTERNAL MEDICINE

## 2024-06-04 PROCEDURE — 70491 CT SOFT TISSUE NECK W/DYE: CPT

## 2024-06-04 PROCEDURE — 82565 ASSAY OF CREATININE: CPT

## 2024-06-04 RX ADMIN — IOPAMIDOL 100 ML: 755 INJECTION, SOLUTION INTRAVENOUS at 17:49

## 2024-06-10 ENCOUNTER — HOSPITAL ENCOUNTER (OUTPATIENT)
Facility: HOSPITAL | Age: 84
Discharge: HOME | End: 2024-06-13
Attending: INTERNAL MEDICINE
Payer: COMMERCIAL

## 2024-06-10 VITALS
OXYGEN SATURATION: 64 % | HEIGHT: 60 IN | TEMPERATURE: 97.9 F | WEIGHT: 130 LBS | SYSTOLIC BLOOD PRESSURE: 130 MMHG | DIASTOLIC BLOOD PRESSURE: 65 MMHG | BODY MASS INDEX: 25.52 KG/M2 | RESPIRATION RATE: 16 BRPM | HEART RATE: 74 BPM

## 2024-06-10 DIAGNOSIS — D72.819 LEUKOPENIA, UNSPECIFIED TYPE: ICD-10-CM

## 2024-06-10 DIAGNOSIS — R59.0 VIRCHOW'S NODE: ICD-10-CM

## 2024-06-10 DIAGNOSIS — D64.9 ANEMIA, UNSPECIFIED TYPE: ICD-10-CM

## 2024-06-10 LAB
BASOPHILS # BLD: 0 K/UL (ref 0–0.1)
BASOPHILS NFR BLD: 0 % (ref 0–1)
DIFFERENTIAL METHOD BLD: ABNORMAL
EOSINOPHIL # BLD: 0 K/UL (ref 0–0.4)
EOSINOPHIL NFR BLD: 0 % (ref 0–7)
ERYTHROCYTE [DISTWIDTH] IN BLOOD BY AUTOMATED COUNT: 14.2 % (ref 11.5–14.5)
HCT VFR BLD AUTO: 24.3 % (ref 35–47)
HGB BLD-MCNC: 8 G/DL (ref 11.5–16)
IMM GRANULOCYTES # BLD AUTO: 0 K/UL (ref 0–0.04)
IMM GRANULOCYTES NFR BLD AUTO: 0 % (ref 0–0.5)
LYMPHOCYTES # BLD: 1.1 K/UL (ref 0.8–3.5)
LYMPHOCYTES NFR BLD: 66 % (ref 12–49)
MCH RBC QN AUTO: 36.4 PG (ref 26–34)
MCHC RBC AUTO-ENTMCNC: 32.9 G/DL (ref 30–36.5)
MCV RBC AUTO: 110.5 FL (ref 80–99)
MONOCYTES # BLD: 0 K/UL (ref 0–1)
MONOCYTES NFR BLD: 1 % (ref 5–13)
NEUTS SEG # BLD: 0.2 K/UL (ref 1.8–8)
NEUTS SEG NFR BLD: 12 % (ref 32–75)
NRBC # BLD: 0.04 K/UL (ref 0–0.01)
NRBC BLD-RTO: 2.3 PER 100 WBC
OTHER CELLS NFR BLD MANUAL: 21
PATH REV BLD -IMP: NORMAL
PLATELET # BLD AUTO: 153 K/UL (ref 150–400)
PMV BLD AUTO: 9.4 FL (ref 8.9–12.9)
RBC # BLD AUTO: 2.2 M/UL (ref 3.8–5.2)
RBC MORPH BLD: ABNORMAL
WBC # BLD AUTO: 1.7 K/UL (ref 3.6–11)

## 2024-06-10 PROCEDURE — 38222 DX BONE MARROW BX & ASPIR: CPT

## 2024-06-10 PROCEDURE — 88313 SPECIAL STAINS GROUP 2: CPT

## 2024-06-10 PROCEDURE — 88341 IMHCHEM/IMCYTCHM EA ADD ANTB: CPT

## 2024-06-10 PROCEDURE — 36415 COLL VENOUS BLD VENIPUNCTURE: CPT

## 2024-06-10 PROCEDURE — 6360000002 HC RX W HCPCS: Performed by: STUDENT IN AN ORGANIZED HEALTH CARE EDUCATION/TRAINING PROGRAM

## 2024-06-10 PROCEDURE — 88311 DECALCIFY TISSUE: CPT

## 2024-06-10 PROCEDURE — 85025 COMPLETE CBC W/AUTO DIFF WBC: CPT

## 2024-06-10 PROCEDURE — 88305 TISSUE EXAM BY PATHOLOGIST: CPT

## 2024-06-10 RX ORDER — MIDAZOLAM HYDROCHLORIDE 1 MG/ML
5 INJECTION INTRAMUSCULAR; INTRAVENOUS
Status: DISCONTINUED | OUTPATIENT
Start: 2024-06-10 | End: 2024-06-14 | Stop reason: HOSPADM

## 2024-06-10 RX ORDER — FENTANYL CITRATE 50 UG/ML
100 INJECTION, SOLUTION INTRAMUSCULAR; INTRAVENOUS
Status: DISCONTINUED | OUTPATIENT
Start: 2024-06-10 | End: 2024-06-14 | Stop reason: HOSPADM

## 2024-06-10 RX ADMIN — MIDAZOLAM 1 MG: 1 INJECTION INTRAMUSCULAR; INTRAVENOUS at 11:51

## 2024-06-10 RX ADMIN — MIDAZOLAM 1 MG: 1 INJECTION INTRAMUSCULAR; INTRAVENOUS at 11:46

## 2024-06-10 RX ADMIN — FENTANYL CITRATE 25 MCG: 50 INJECTION INTRAMUSCULAR; INTRAVENOUS at 11:53

## 2024-06-10 RX ADMIN — FENTANYL CITRATE 25 MCG: 50 INJECTION INTRAMUSCULAR; INTRAVENOUS at 11:47

## 2024-06-10 ASSESSMENT — PAIN - FUNCTIONAL ASSESSMENT: PAIN_FUNCTIONAL_ASSESSMENT: NONE - DENIES PAIN

## 2024-06-10 NOTE — DISCHARGE INSTRUCTIONS
[facility]  Radiology Department  [phone]    Radiologist: JESSICA Ackerman MD/NETO Cohen NP    Date: 0610/2024       Bone Marrow Biopsy Discharge Instructions      Go home and rest  and restrict your activity the next 24 hours.     You have been given sedating medications, so do not drive or make important decisions today.      Resume your previous diet and prescribed medications.    You may shower in 24 hours.  Do not soak or swim until the biopsy site has healed completely to minimize any risk of infection.  Watch site for redness, drainage, pus, foul odor, increasing pain and fevers.  Should this occur call you doctor immediatly.     You may take Tylenol if allowed, as directed on the label, for pain or discomfort.  Avoid Ibuprofen (Advil, Motrin etc.) and Aspirin today as they may increase your risk of bleeding.       Be sure to follow up with your physician, and let him know how you are progressing and to receive your results.       If you have any questions about your procedure today please call and ask to speak to a radiology nurse.       I

## 2024-06-10 NOTE — H&P
INTERVENTIONAL RADIOLOGY  Preoperative History and Physical      Patient:  Clarissa J Hatchett  :  1940  Age:  83 y.o.  MRN:  [id number]  Today's Date:  6/10/2024      CC / HPI   Clarissa J Hatchett is a 83 y.o. female with a history of leukopenia who presents for bone marrow biopsy.    PAST MEDICAL HISTORY  Past Medical History:   Diagnosis Date    Anxiety     CAD (coronary artery disease) 10/11/2012    Cancer (HCC)     lung    Cancer (HCC)     lung CA    Chronic obstructive pulmonary disease (HCC)     Chronic pain     legs and lower back    Depression     Dyslipidemia 10/11/2012    Endocrine disease     Gastrointestinal disorder     gastric ulcers    GERD (gastroesophageal reflux disease)     Headache(784.0)     Hematuria     Hypertension     Lung cancer (HCC)     Malignant neoplasm of bronchus (HCC)     Meningitis 2010    Psychiatric disorder     depression, anxiety    Psychiatric disorder     depression    PUD (peptic ulcer disease)        PAST SURGICAL HISTORY  Past Surgical History:   Procedure Laterality Date    BREAST BIOPSY Left     neg path    CHEST SURGERY      left upper lobe ectomy secondary to carcinoma    DELIVERY       GI      reflux surgery    GYN       3 c-sections    HYSTERECTOMY (CERVIX STATUS UNKNOWN)      OTHER SURGICAL HISTORY      lobectomy    WI UNLISTED PROCEDURE CARDIAC SURGERY      JUJU placed 10- by Dr. Esteves    WI UNLISTED PROCEDURE CARDIAC SURGERY      cardiac stents    REMOVE LUNG/CHEST WALL      UROLOGICAL SURGERY      stone rmvl with lithotripsy    VASCULAR SURGERY      leg stents    VASCULAR SURGERY      fem-fem bypass       SOCIAL HISTORY  Social History     Socioeconomic History    Marital status:      Spouse name: Not on file    Number of children: Not on file    Years of education: Not on file    Highest education level: Not on file   Occupational History    Not on file   Tobacco Use    Smoking status: Former    Smokeless tobacco: Not on

## 2024-06-10 NOTE — PROGRESS NOTES
Arrived into the xray recovery area ambulatory, alert and oriented. Here today for an image guided bone marrow biopsy.